# Patient Record
Sex: MALE | Race: WHITE | NOT HISPANIC OR LATINO | Employment: FULL TIME | URBAN - METROPOLITAN AREA
[De-identification: names, ages, dates, MRNs, and addresses within clinical notes are randomized per-mention and may not be internally consistent; named-entity substitution may affect disease eponyms.]

---

## 2022-08-26 ENCOUNTER — VIRTUAL VISIT (OUTPATIENT)
Dept: URGENT CARE | Facility: CLINIC | Age: 62
End: 2022-08-26
Payer: COMMERCIAL

## 2022-08-26 ENCOUNTER — NURSE TRIAGE (OUTPATIENT)
Dept: NURSING | Facility: CLINIC | Age: 62
End: 2022-08-26

## 2022-08-26 DIAGNOSIS — Z53.9 ERRONEOUS ENCOUNTER--DISREGARD: Primary | ICD-10-CM

## 2022-08-27 NOTE — TELEPHONE ENCOUNTER
Cough, sore throat, chills, loss of taste.  Symptoms started 8-24-22.  Denies dyspnea, chest pain.      Gather patient reported symptoms   Assessment   Current Symptoms at time of phone call, reported by patient: (if no symptoms, document No symptoms] Cough, loss of taste, sore throat   Date of Symptom(s) onset (if applicable) 8-24-22     If at time of call, Patients symptoms hare worsened, the Patient should contact 911 or have someone drive them to Emergency Dept promptly:      If Patient calling 911, inform 911 personal that you have tested positive for the Coronavirus (COVID-19).  Place mask on and await 911 to arrive.    If Emergency Dept, If possible, please have another adult drive you to the Emergency Dept but you need to wear mask when in contact with other people.      Monoclonal Antibody Administration    You may be eligible to receive a new treatment with a monoclonal antibody for preventing hospitalization in patients at high risk for complications from COVID-19. This medication is still experimental and available on a limited basis; it is given through an IV and must be given at an infusion center. Please note that not all people who are eligible will receive the medication since it is in limited supply.  Is the patient symptomatic and onset of symptoms within the last 7 days?  Yes  Is the patient interested in a visit with a provider to discuss treatment options?: Yes  Is the patient seen at [facility]?  No: Warm transfer caller to [phone] to be scheduled with a virtual urgent provider.  During transfer, instruct  on appropriate time frame for visit     Review information with Patient    Your result was positive. This means you have COVID-19 (coronavirus).      How can I protect others?    These guidelines are for isolating before returning to work, school or .       If you DO have symptoms:  o Stay home and away from others  - For at least 5 days after your symptoms  started, AND   - You are fever free for 24 hours (with no medicine that reduces fever), AND  - Your other symptoms are better.  o Wear a mask for 10 full days any time you are around others.    If you DON'T have symptoms:  o Stay at home and away from others for at least 5 days after your positive test.  o Wear a mask for 10 full days any time you are around others.    There may be different guidelines for healthcare facilities. Please check with the specific sites before arriving.     If you've been told by a doctor that you were severely ill with COVID-19 or are immunocompromised, you should isolate for at least 10 days.    You should not go back to work until you meet the guidelines above for ending your home isolation. You don't need to be retested for COVID-19 before going back to work--studies show that you won't spread the virus if it's been at least 10 days since your symptoms started (or 20 days, if you have a weak immune system).    Employers, schools, and daycares: This is an official notice for this person's medical guidelines for returning in-person. They must meet the above guidelines before going back to work, school, or  in person.    You will receive a positive COVID-19 letter via [employer] or the mail soon with additional self-care information.      Would you like me to review some of that information with you now?  Yes    How can I take care of myself?      Get lots of rest. Drink extra fluids (unless a doctor has told you not to).      Take Tylenol (acetaminophen) for fever or pain. If you have liver or kidney problems, ask your family doctor if it's okay to take Tylenol.     Take either:     650 mg (two 325 mg pills) every 4 to 6 hours, or     1,000 mg (two 500 mg pills) every 8 hours as needed.     Note: Do not take more than 3,000 mg in one day. Acetaminophen is found in many medicines (both prescribed and over-the-counter medicines). Read all labels to be sure you don't take too  much.    For children, check the Tylenol bottle for the right dose (based on their age or weight).      If you have other health problems (like cancer, heart failure, an organ transplant or severe kidney disease): Call your specialty clinic if you don't feel better in the next 2 days.      Know when to call 911: Emergency warning signs include:    Trouble breathing or shortness of breath    Pain or pressure in the chest that doesn't go away    Feeling confused like you haven't felt before, or not being able to wake up    Bluish-colored lips or face        If you were tested for an upcoming procedure, please contact your provider for next steps.     Mitzy Chapman RN

## 2022-08-29 ENCOUNTER — VIRTUAL VISIT (OUTPATIENT)
Dept: FAMILY MEDICINE | Facility: CLINIC | Age: 62
End: 2022-08-29

## 2022-08-29 DIAGNOSIS — U07.1 INFECTION DUE TO 2019 NOVEL CORONAVIRUS: Primary | ICD-10-CM

## 2022-08-29 DIAGNOSIS — E66.3 OVERWEIGHT: ICD-10-CM

## 2022-08-29 PROCEDURE — 99203 OFFICE O/P NEW LOW 30 MIN: CPT | Mod: CS | Performed by: FAMILY MEDICINE

## 2022-08-29 ASSESSMENT — PAIN SCALES - GENERAL: PAINLEVEL: NO PAIN (0)

## 2022-08-29 NOTE — PROGRESS NOTES
Bryan is a 62 year old who is being evaluated via a billable telephone visit.      What phone number would you like to be contacted at? [phone]  How would you like to obtain your AVS? Mail a copy    Assessment & Plan     Infection due to 2019 novel coronavirus  Risk criteria met for overweight. No renal function available, but he reports no prior issues and not on any renal impairment medications. Discussed self care. Discussed isolation and quarantine. Will utilize Paxlovid treatment. All questions answered.   - nirmatrelvir and ritonavir (PAXLOVID) therapy pack  Dispense: 30 each; Refill: 0    Overweight  - nirmatrelvir and ritonavir (PAXLOVID) therapy pack  Dispense: 30 each; Refill: 0    The risks, benefits and treatment options of prescribed medications or other treatments have been discussed with the patient. The patient verbalized their understanding and should call or follow up if no improvement or if they develop further problems.    Follow up if not improving or worsening.       Floyd Dhaliwal [facility]    Subjective   Bryan is a 62 year old, presenting for the following health issues:  Covid Concern      HPI       COVID-19 Symptom Review  How many days ago did these symptoms start? 5 days ago, tested positive Thursday night    Are any of the following symptoms significant for you?    New or worsening difficulty breathing? No    Worsening cough? Yes, I am coughing up mucus.    Fever or chills? Yes, I felt feverish or had chills.    Headache: YES- gone now    Sore throat: YES    Chest pain: No    Diarrhea: YES    Body aches? YES- gone now    What treatments has patient tried? Drinking water   Does patient live in a nursing home, group home, or shelter? No  Does patient have a way to get food/medications during quarantined? Yes, I have a friend or family member who can help me.    Reports overall is starting to feel better.   Had some cough, which starts to improve.  "  Continues to have a slightly sore throat, but continues to improve.   Coughing up a little bit of \"crud\"   Current weight is 255.   He is about 6'3   No tobacco use for last 30 years   Works security at [employer].             Review of Systems   Constitutional, HEENT, cardiovascular, pulmonary, gi and gu systems are negative, except as otherwise noted.      Objective           Vitals:  No vitals were obtained today due to virtual visit.    Physical Exam   No exam, telephone visit.     Phone call duration: 12 minutes    .  ..  "

## 2022-09-13 ENCOUNTER — OFFICE VISIT (OUTPATIENT)
Dept: FAMILY MEDICINE | Facility: CLINIC | Age: 62
End: 2022-09-13
Payer: COMMERCIAL

## 2022-09-13 VITALS
BODY MASS INDEX: 29.72 KG/M2 | DIASTOLIC BLOOD PRESSURE: 88 MMHG | OXYGEN SATURATION: 96 % | HEIGHT: 75 IN | WEIGHT: 239 LBS | SYSTOLIC BLOOD PRESSURE: 143 MMHG | HEART RATE: 74 BPM

## 2022-09-13 DIAGNOSIS — R03.0 ELEVATED BLOOD PRESSURE READING WITHOUT DIAGNOSIS OF HYPERTENSION: ICD-10-CM

## 2022-09-13 DIAGNOSIS — Z86.16 HISTORY OF COVID-19: Primary | ICD-10-CM

## 2022-09-13 PROCEDURE — 90682 RIV4 VACC RECOMBINANT DNA IM: CPT | Performed by: FAMILY MEDICINE

## 2022-09-13 PROCEDURE — 90472 IMMUNIZATION ADMIN EACH ADD: CPT | Performed by: FAMILY MEDICINE

## 2022-09-13 PROCEDURE — 90714 TD VACC NO PRESV 7 YRS+ IM: CPT | Performed by: FAMILY MEDICINE

## 2022-09-13 PROCEDURE — 90471 IMMUNIZATION ADMIN: CPT | Performed by: FAMILY MEDICINE

## 2022-09-13 PROCEDURE — 99213 OFFICE O/P EST LOW 20 MIN: CPT | Mod: 25 | Performed by: FAMILY MEDICINE

## 2022-09-13 RX ORDER — CETIRIZINE HYDROCHLORIDE 10 MG/1
10 TABLET ORAL DAILY
COMMUNITY

## 2022-09-13 NOTE — LETTER
September 13, 2022      True Wakefield  [address] [place] [address]        To Whom It May Concern:    True K Ashu was seen in our clinic. He may return to work without restrictions.      Sincerely,        Silvia Potts, DO

## 2022-09-13 NOTE — PROGRESS NOTES
Assessment & Plan     History of COVID-19  Tested positive for COVID-19 and was subsequently out of work 8/25 through 9/13. S/p paxlovid with near resolution of his symptoms.  Letter written today to return to work without restrictions.    Elevated blood pressure reading without diagnosis of hypertension  Blood pressure mildly elevated on recheck today.  No prior diagnosis of hypertension but has not had routine medical care.  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      Return in about 4 weeks (around 10/11/2022) for Routine preventive.    Silvia Potts [facility]    Rigo Adams is a 62 year old, presenting for the following health issues:  Letter for School/Work (Letter for work due to being out with covid, Out of work 8/25-9/13, was on paxlovid )      History of Present Illness       Reason for visit:  Need clearance to return to work after missing over 10 days due to a positive Covid-19 test. Have recently retested negative.    He eats 0-1 servings of fruits and vegetables daily.He consumes 4 sweetened beverage(s) daily.He exercises with enough effort to increase his heart rate 30 to 60 minutes per day.  He exercises with enough effort to increase his heart rate 5 days per week.   He is taking medications regularly.     Left URI symptoms on 8/24/2022, tested positive for COVID-19 the following day.  Symptoms included cough, congestion, sore throat.  His cough persisted and he was asked to remain out of work until he had a negative test.  He was notified by his employer with updates that he does not need a negative test to return as long as his symptoms are improving and it has been greater than 5 days since symptom onset.  He initially was on Paxil bid and had 1 day of diarrhea but otherwise tolerated this well.   "He had a slight cough that persisted but it is much better today than before.  He denies shortness of breath or chest pain.  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  He would like to return to work tomorrow and does not need restrictions.        Objective    BP (!) 143/88   Pulse 74   Ht 1.905 m (6' 3\")   Wt 108.4 kg (239 lb)   SpO2 96%   BMI 29.87 kg/m    Body mass index is 29.87 kg/m .  Physical Exam   GENERAL: healthy, alert and no distress  EYES: Eyes grossly normal to inspection, PERRL and conjunctivae and sclerae normal  RESP: lungs clear to auscultation - no rales, rhonchi or wheezes  CV: regular rate and rhythm, normal S1 S2, no S3 or S4, no murmur, click or rub, no peripheral edema and peripheral pulses strong  PSYCH: mentation appears normal, affect normal/bright        "

## 2022-10-01 ENCOUNTER — HEALTH MAINTENANCE LETTER (OUTPATIENT)
Age: 62
End: 2022-10-01

## 2023-10-15 ENCOUNTER — HEALTH MAINTENANCE LETTER (OUTPATIENT)
Age: 63
End: 2023-10-15

## 2023-11-05 ENCOUNTER — APPOINTMENT (OUTPATIENT)
Dept: MRI IMAGING | Facility: HOSPITAL | Age: 63
End: 2023-11-05
Attending: EMERGENCY MEDICINE
Payer: COMMERCIAL

## 2023-11-05 ENCOUNTER — HOSPITAL ENCOUNTER (EMERGENCY)
Facility: HOSPITAL | Age: 63
Discharge: HOME | End: 2023-11-06
Attending: EMERGENCY MEDICINE | Admitting: EMERGENCY MEDICINE
Payer: COMMERCIAL

## 2023-11-05 DIAGNOSIS — H81.399 PERIPHERAL VERTIGO, UNSPECIFIED LATERALITY: ICD-10-CM

## 2023-11-05 LAB
ANION GAP SERPL CALCULATED.3IONS-SCNC: 8 MMOL/L (ref 7–15)
BASOPHILS # BLD AUTO: 0 10E3/UL (ref 0–0.2)
BASOPHILS NFR BLD AUTO: 0 %
BUN SERPL-MCNC: 12.3 MG/DL (ref 8–23)
CALCIUM SERPL-MCNC: 11.2 MG/DL (ref 8.8–10.2)
CHLORIDE SERPL-SCNC: 105 MMOL/L (ref 98–107)
CREAT SERPL-MCNC: 0.74 MG/DL (ref 0.67–1.17)
DEPRECATED HCO3 PLAS-SCNC: 25 MMOL/L (ref 22–29)
EGFRCR SERPLBLD CKD-EPI 2021: >90 ML/MIN/1.73M2
EOSINOPHIL # BLD AUTO: 0 10E3/UL (ref 0–0.7)
EOSINOPHIL NFR BLD AUTO: 0 %
ERYTHROCYTE [DISTWIDTH] IN BLOOD BY AUTOMATED COUNT: 13.3 % (ref 10–15)
GLUCOSE BLDC GLUCOMTR-MCNC: 112 MG/DL (ref 70–99)
GLUCOSE SERPL-MCNC: 115 MG/DL (ref 70–99)
HCT VFR BLD AUTO: 45.9 % (ref 40–53)
HGB BLD-MCNC: 15.8 G/DL (ref 13.3–17.7)
HOLD SPECIMEN: NORMAL
HOLD SPECIMEN: NORMAL
IMM GRANULOCYTES # BLD: 0 10E3/UL
IMM GRANULOCYTES NFR BLD: 0 %
LYMPHOCYTES # BLD AUTO: 0.9 10E3/UL (ref 0.8–5.3)
LYMPHOCYTES NFR BLD AUTO: 16 %
MCH RBC QN AUTO: 30.2 PG (ref 26.5–33)
MCHC RBC AUTO-ENTMCNC: 34.4 G/DL (ref 31.5–36.5)
MCV RBC AUTO: 88 FL (ref 78–100)
MONOCYTES # BLD AUTO: 0.5 10E3/UL (ref 0–1.3)
MONOCYTES NFR BLD AUTO: 8 %
NEUTROPHILS # BLD AUTO: 4 10E3/UL (ref 1.6–8.3)
NEUTROPHILS NFR BLD AUTO: 76 %
NRBC # BLD AUTO: 0 10E3/UL
NRBC BLD AUTO-RTO: 0 /100
PLATELET # BLD AUTO: 169 10E3/UL (ref 150–450)
POTASSIUM SERPL-SCNC: 3.7 MMOL/L (ref 3.4–5.3)
RBC # BLD AUTO: 5.23 10E6/UL (ref 4.4–5.9)
SODIUM SERPL-SCNC: 138 MMOL/L (ref 135–145)
WBC # BLD AUTO: 5.4 10E3/UL (ref 4–11)

## 2023-11-05 PROCEDURE — 70549 MR ANGIOGRAPH NECK W/O&W/DYE: CPT

## 2023-11-05 PROCEDURE — A9585 GADOBUTROL INJECTION: HCPCS | Mod: JZ | Performed by: EMERGENCY MEDICINE

## 2023-11-05 PROCEDURE — 80048 BASIC METABOLIC PNL TOTAL CA: CPT | Performed by: EMERGENCY MEDICINE

## 2023-11-05 PROCEDURE — 36415 COLL VENOUS BLD VENIPUNCTURE: CPT | Performed by: EMERGENCY MEDICINE

## 2023-11-05 PROCEDURE — 70544 MR ANGIOGRAPHY HEAD W/O DYE: CPT | Mod: XU

## 2023-11-05 PROCEDURE — 255N000002 HC RX 255 OP 636: Mod: JZ | Performed by: EMERGENCY MEDICINE

## 2023-11-05 PROCEDURE — 93005 ELECTROCARDIOGRAM TRACING: CPT | Performed by: EMERGENCY MEDICINE

## 2023-11-05 PROCEDURE — 70553 MRI BRAIN STEM W/O & W/DYE: CPT

## 2023-11-05 PROCEDURE — 99285 EMERGENCY DEPT VISIT HI MDM: CPT | Mod: 25

## 2023-11-05 PROCEDURE — 85025 COMPLETE CBC W/AUTO DIFF WBC: CPT | Performed by: EMERGENCY MEDICINE

## 2023-11-05 PROCEDURE — 82962 GLUCOSE BLOOD TEST: CPT

## 2023-11-05 PROCEDURE — 250N000013 HC RX MED GY IP 250 OP 250 PS 637: Performed by: EMERGENCY MEDICINE

## 2023-11-05 RX ORDER — MECLIZINE HYDROCHLORIDE 25 MG/1
25 TABLET ORAL 3 TIMES DAILY PRN
Qty: 30 TABLET | Refills: 0 | Status: SHIPPED | OUTPATIENT
Start: 2023-11-05 | End: 2023-11-24

## 2023-11-05 RX ORDER — ONDANSETRON 4 MG/1
4 TABLET, ORALLY DISINTEGRATING ORAL EVERY 8 HOURS PRN
Qty: 12 TABLET | Refills: 0 | Status: SHIPPED | OUTPATIENT
Start: 2023-11-05

## 2023-11-05 RX ORDER — GADOBUTROL 604.72 MG/ML
10 INJECTION INTRAVENOUS ONCE
Status: COMPLETED | OUTPATIENT
Start: 2023-11-05 | End: 2023-11-05

## 2023-11-05 RX ORDER — MECLIZINE HCL 12.5 MG 12.5 MG/1
25 TABLET ORAL ONCE
Status: DISCONTINUED | OUTPATIENT
Start: 2023-11-05 | End: 2023-11-05

## 2023-11-05 RX ORDER — MECLIZINE HCL 12.5 MG 12.5 MG/1
50 TABLET ORAL ONCE
Status: COMPLETED | OUTPATIENT
Start: 2023-11-05 | End: 2023-11-05

## 2023-11-05 RX ADMIN — GADOBUTROL 10 ML: 604.72 INJECTION INTRAVENOUS at 22:22

## 2023-11-05 RX ADMIN — MECLIZINE HYDROCHLORIDE 50 MG: 12.5 TABLET ORAL at 21:11

## 2023-11-05 ASSESSMENT — ACTIVITIES OF DAILY LIVING (ADL)
ADLS_ACUITY_SCORE: 35
ADLS_ACUITY_SCORE: 35

## 2023-11-05 NOTE — Clinical Note
True Wakefield was seen and treated in our emergency department on 11/5/2023.  He may return to work on 11/11/2023.       If you have any questions or concerns, please don't hesitate to call.      Noe Mane, DO

## 2023-11-06 VITALS
WEIGHT: 246.1 LBS | RESPIRATION RATE: 18 BRPM | OXYGEN SATURATION: 95 % | HEIGHT: 75 IN | DIASTOLIC BLOOD PRESSURE: 96 MMHG | BODY MASS INDEX: 30.6 KG/M2 | TEMPERATURE: 98.2 F | HEART RATE: 74 BPM | SYSTOLIC BLOOD PRESSURE: 160 MMHG

## 2023-11-06 NOTE — ED PROVIDER NOTES
"EMERGENCY DEPARTMENT NOTE     Name: True Wakefield    Age/Sex: 63 year old male   MRN: [id number]   Evaluation Date & Time:  11/5/2023  8:02 PM    PCP:    Rome Ledezma   ED Provider: Noe Mane D.O.       CHIEF COMPLAINT    Dizziness       DIAGNOSIS & DISPOSITION/MEDICAL DECISION MAKING   No diagnosis found.    True Wakefield is a 63 year old male with no relevant past history (reviewed) who presents to the emergency department for evaluation of dizziness described as vertigo    Differential  diagnosis considered included but not limited to control versus peripheral cause of vertigo or electrolyte derangement    Medical Decision Making  Patient on exam had vertical rotary nystagmus.  Other cerebellar function testing within normal limits.  MRI of the brain showed no evidence of ischemic or hemorrhagic stroke and MRA of the intracranial and carotid vertebral arteries within normal limits without evidence of posterior circulation problem including vertebral artery dissection.  Laboratory evaluation with CBC and basic metabolic profile within normal limits.  Patient has had remained with meclizine.  Patient will be discharged to continue meclizine for management of vertigo symptoms.  Follow-up with primary care physician this week.  We will also use Zofran as needed for nausea.  Return criteria discussed and if worsening vertigo not improved with meclizine and feels of also describes vomiting despite use of Zofran will return to the emergency department.  Interventions: Oral meclizine  Discharge Vital Signs:BP (!) 152/91   Pulse 76   Temp 98.2  F (36.8  C) (Oral)   Resp 18   Ht 1.905 m (6' 3\")   Wt 111.6 kg (246 lb 1.6 oz)   SpO2 93%   BMI 30.76 kg/m       DISPOSITION: Home    Diagnostic studies:  Imaging:  MRA Neck (Carotids) wo & w Contrast   Final Result   IMPRESSION:   HEAD MRI:    1.  No acute intracranial process.      HEAD MRA:    1.  No aneurysm, high flow AVM or significant stenosis " identified.   2.  Variant [place] anatomy as above.      NECK MRA:   1.  No flow-limiting stenosis of the cervical arterial vasculature.      MRA Brain ([place]) wo Contrast   Final Result   IMPRESSION:   HEAD MRI:    1.  No acute intracranial process.      HEAD MRA:    1.  No aneurysm, high flow AVM or significant stenosis identified.   2.  Variant [place] anatomy as above.      NECK MRA:   1.  No flow-limiting stenosis of the cervical arterial vasculature.      MR Brain w/o & w Contrast   Final Result   IMPRESSION:   HEAD MRI:    1.  No acute intracranial process.      HEAD MRA:    1.  No aneurysm, high flow AVM or significant stenosis identified.   2.  Variant [place] anatomy as above.      NECK MRA:   1.  No flow-limiting stenosis of the cervical arterial vasculature.         Lab:  Labs Ordered and Resulted from Time of ED Arrival to Time of ED Departure   GLUCOSE BY METER - Abnormal       Result Value    GLUCOSE BY METER POCT 112 (*)    BASIC METABOLIC PANEL - Abnormal    Sodium 138      Potassium 3.7      Chloride 105      Carbon Dioxide (CO2) 25      Anion Gap 8      Urea Nitrogen 12.3      Creatinine 0.74      GFR Estimate >90      Calcium 11.2 (*)     Glucose 115 (*)    CBC WITH PLATELETS AND DIFFERENTIAL    WBC Count 5.4      RBC Count 5.23      Hemoglobin 15.8      Hematocrit 45.9      MCV 88      MCH 30.2      MCHC 34.4      RDW 13.3      Platelet Count 169      % Neutrophils 76      % Lymphocytes 16      % Monocytes 8      % Eosinophils 0      % Basophils 0      % Immature Granulocytes 0      NRBCs per 100 WBC 0      Absolute Neutrophils 4.0      Absolute Lymphocytes 0.9      Absolute Monocytes 0.5      Absolute Eosinophils 0.0      Absolute Basophils 0.0      Absolute Immature Granulocytes 0.0      Absolute NRBCs 0.0                 Triage note reviewed:Patient arrives to triage via a friend from home with chief compliant of dizziness since 0300 Saturday morning.   Patient reports when he goes from sitting to standing or moving his head around he gets severe dizziness which makes it difficult to walk.  Endorses nausea, vomiting and headache.  Reports taking Ibuprofen about two hours and it is helping the headache.  Blood glucose 112 mg/dL in triage.  Patient is hypertensive in triage, reports this has been an issue lately.  Alert and oriented x4.             History:  Supplemental history from: Patients Friend  External Record(s) reviewed: Documented in chart, if applicable. PCP visit on 9/13/22    Work Up:  Chart documentation includes differential considered and any EKGs or imaging independently interpreted by provider, where specified.  In additional to work up documented, I considered the following work up: NA    External consultation:  Discussion of management with another provider: NA    Complicating factors:  Care impacted by chronic illness: N/A  Care affected by social determinants of health: N/A    Disposition considerations: Discharge. I prescribed additional prescription strength medication(s) as charted. N/A.    At the conclusion of the encounter I discussed the results of all of the tests and the disposition. The questions were answered. The patient or family acknowledged understanding and was agreeable with the care plan.    TOTAL CRITICAL CARE TIME (EXCLUDING PROCEDURES): Not applicable    PROCEDURES:   None    EMERGENCY DEPARTMENT COURSE   8:27 PM I met with the patient to gather history and to perform my initial exam.  We discussed treatment options and the plan for care while in the Emergency Department.    ED INTERVENTIONS     Medications   meclizine (ANTIVERT) tablet 50 mg (50 mg Oral $Given 11/5/23 2111)   gadobutrol (GADAVIST) injection 10 mL (10 mLs Intravenous $Given 11/5/23 2222)       DISCHARGE MEDICATIONS        Review of your medicines        UNREVIEWED medicines. Ask your doctor about these medicines        Dose / Directions   cetirizine 10 MG  tablet  Commonly known as: zyrTEC      Dose: 10 mg  Take 10 mg by mouth daily  Refills: 0     Omeprazole 20 MG Tbdd      Dose: 20 mg  Take 20 mg by mouth daily  Refills: 0     sodium chloride 0.65 % nasal spray  Commonly known as: OCEAN      Dose: 1 spray  Spray 1 spray into both nostrils daily as needed for congestion  Refills: 0                INFORMATION SOURCE AND LIMITATIONS    History/Exam limitations: none  Patient information was obtained from: patient   Use of : N/A     HISTORY OF PRESENT ILLNESS   True Wakefield is a 63 year old year old male with no relevant past history (reviewed), who presents to this ED walk in for evaluation of dizziness.    Since 3 am on Saturday morning, the patient reports room spinning dizziness. He notes this episode lasted 10-15 minutes before he was able to go to bed. In the morning, he woke up with residual dizziness that has been constant since. He notes its worse with standing and moving his head. The patient endorses off balance gait. He reports an associated headache. He has been taking tylenol with relief. He otherwise denies any other complaints at this time.    REVIEW OF SYSTEMS:   All other systems reviewed and are negative except as noted above in HPI.    PATIENT HISTORY   History reviewed. No pertinent past medical history.  Patient Active Problem List   Diagnosis    Overweight     History reviewed. No pertinent surgical history.    Allergies   Allergen Reactions    Amoxicillin-Pot Clavulanate Anaphylaxis and Swelling    Amoxicillin Swelling    Clavulanic Acid Swelling       OUTPATIENT MEDICATIONS     New Prescriptions    No medications on file      Vitals:    11/05/23 2145 11/05/23 2258 11/05/23 2300 11/05/23 2315   BP: (!) 184/98 (!) 165/84 (!) 153/82 (!) 152/91   Pulse: 77 81 79 76   Resp: 30  15 18   Temp:       TempSrc:       SpO2: 91% 93% 94% 93%   Weight:       Height:           Physical Exam   Constitutional: Oriented to person, place, and time.  Appears well-developed and well-nourished.   HEENT: PERRLA,vertical and nystagmus present  TMs normal  Neck: Normal range of motion. Neck supple.  No carotid bruit  Cardiovascular: Normal rate, regular rhythm and normal heart sounds.    Pulmonary/Chest: Normal effort  and breath sounds normal.   Neurological: Alert and oriented to person, place, and time. Normal strength. No sensory deficit. No cranial nerve deficit.  Skin: Skin is warm and dry.   Psychiatric: Normal mood and affect. Behavior is normal. Thought content normal.     DIAGNOSTICS    LABORATORY FINDINGS (REVIEWED AND INTERPRETED):  Labs Ordered and Resulted from Time of ED Arrival to Time of ED Departure   GLUCOSE BY METER - Abnormal       Result Value    GLUCOSE BY METER POCT 112 (*)    BASIC METABOLIC PANEL - Abnormal    Sodium 138      Potassium 3.7      Chloride 105      Carbon Dioxide (CO2) 25      Anion Gap 8      Urea Nitrogen 12.3      Creatinine 0.74      GFR Estimate >90      Calcium 11.2 (*)     Glucose 115 (*)    CBC WITH PLATELETS AND DIFFERENTIAL    WBC Count 5.4      RBC Count 5.23      Hemoglobin 15.8      Hematocrit 45.9      MCV 88      MCH 30.2      MCHC 34.4      RDW 13.3      Platelet Count 169      % Neutrophils 76      % Lymphocytes 16      % Monocytes 8      % Eosinophils 0      % Basophils 0      % Immature Granulocytes 0      NRBCs per 100 WBC 0      Absolute Neutrophils 4.0      Absolute Lymphocytes 0.9      Absolute Monocytes 0.5      Absolute Eosinophils 0.0      Absolute Basophils 0.0      Absolute Immature Granulocytes 0.0      Absolute NRBCs 0.0           IMAGING (REVIEWED AND INTERPRETED):  MRA Neck (Carotids) wo & w Contrast   Final Result   IMPRESSION:   HEAD MRI:    1.  No acute intracranial process.      HEAD MRA:    1.  No aneurysm, high flow AVM or significant stenosis identified.   2.  Variant [place] anatomy as above.      NECK MRA:   1.  No flow-limiting stenosis of the cervical arterial vasculature.       MRA Brain ([place]) wo Contrast   Final Result   IMPRESSION:   HEAD MRI:    1.  No acute intracranial process.      HEAD MRA:    1.  No aneurysm, high flow AVM or significant stenosis identified.   2.  Variant [place] anatomy as above.      NECK MRA:   1.  No flow-limiting stenosis of the cervical arterial vasculature.      MR Brain w/o & w Contrast   Final Result   IMPRESSION:   HEAD MRI:    1.  No acute intracranial process.      HEAD MRA:    1.  No aneurysm, high flow AVM or significant stenosis identified.   2.  Variant [place] anatomy as above.      NECK MRA:   1.  No flow-limiting stenosis of the cervical arterial vasculature.            ECG (REVIEWED AND INTERPRETED):   ECG:   Performed at: 20:17  HR:  79bpm  Rhythm: Sinus rhythm  Axis: 89  QRS duration: 104  QTC: 431  ST changes: No ST segment elevation or depression, no T wave inversion,No Q wave  Interpretation: Sinus rhythm    No prior for comparison    I have reviewed the patient's ECG, with comments made as listed above. Please see scanned image for full interpretation.         I, Roro De Paz, am serving as a scribe to document services personally performed by Noe Mane D.O., based on my observation and the provider s statements to me.    I, Noe Mane D.O., attest that Roro De Paz is acting in a scribe capacity, has observed my performance of the services and has documented them in accordance with my direction.    Noe Mane D.O.  EMERGENCY MEDICINE   11/05/23  [facility] EMERGENCY DEPARTMENT  [address] [place] [id number]  [phone]  Dept: [phone]     Noe Mane DO  11/05/23 7034

## 2023-11-06 NOTE — DISCHARGE INSTRUCTIONS
Your MRI and MRA did not show any abnormalities.  The dizziness is probably related to an inner ear process.  Continue meclizine every 6 hours for dizziness and use Zofran as needed for nausea.  You can trial Epley maneuvers or exercises to see if this results in improvement.  Follow-up with your primary care physician this week to determine if you need further referral to vestibular clinic.  If you have recurrent dizziness not improved with meclizine and feels it falls risk or vomiting despite the use of Zofran return to the emergency department.

## 2023-11-06 NOTE — ED TRIAGE NOTES
Patient arrives to triage via a friend from home with chief compliant of dizziness since 0300 Saturday morning.  Patient reports when he goes from sitting to standing or moving his head around he gets severe dizziness which makes it difficult to walk.  Endorses nausea, vomiting and headache.  Reports taking Ibuprofen about two hours and it is helping the headache.  Blood glucose 112 mg/dL in triage.  Patient is hypertensive in triage, reports this has been an issue lately.  Alert and oriented x4.

## 2023-11-20 LAB
ATRIAL RATE - MUSE: 79 BPM
DIASTOLIC BLOOD PRESSURE - MUSE: 86 MMHG
INTERPRETATION ECG - MUSE: NORMAL
P AXIS - MUSE: 69 DEGREES
PR INTERVAL - MUSE: 168 MS
QRS DURATION - MUSE: 104 MS
QT - MUSE: 376 MS
QTC - MUSE: 431 MS
R AXIS - MUSE: 89 DEGREES
SYSTOLIC BLOOD PRESSURE - MUSE: 171 MMHG
T AXIS - MUSE: 55 DEGREES
VENTRICULAR RATE- MUSE: 79 BPM

## 2023-11-24 ENCOUNTER — OFFICE VISIT (OUTPATIENT)
Dept: FAMILY MEDICINE | Facility: CLINIC | Age: 63
End: 2023-11-24
Payer: COMMERCIAL

## 2023-11-24 VITALS
TEMPERATURE: 97.8 F | RESPIRATION RATE: 18 BRPM | BODY MASS INDEX: 31.07 KG/M2 | WEIGHT: 248.6 LBS | OXYGEN SATURATION: 100 % | DIASTOLIC BLOOD PRESSURE: 93 MMHG | SYSTOLIC BLOOD PRESSURE: 159 MMHG | HEART RATE: 72 BPM

## 2023-11-24 DIAGNOSIS — E83.52 HYPERCALCEMIA: ICD-10-CM

## 2023-11-24 DIAGNOSIS — Z12.5 SCREENING PSA (PROSTATE SPECIFIC ANTIGEN): ICD-10-CM

## 2023-11-24 DIAGNOSIS — R73.9 ELEVATED BLOOD SUGAR: Primary | ICD-10-CM

## 2023-11-24 DIAGNOSIS — R42 VERTIGO: ICD-10-CM

## 2023-11-24 LAB
ANION GAP SERPL CALCULATED.3IONS-SCNC: 8 MMOL/L (ref 7–15)
BUN SERPL-MCNC: 14.2 MG/DL (ref 8–23)
CALCIUM SERPL-MCNC: 10.6 MG/DL (ref 8.8–10.2)
CHLORIDE SERPL-SCNC: 106 MMOL/L (ref 98–107)
CREAT SERPL-MCNC: 0.85 MG/DL (ref 0.67–1.17)
DEPRECATED HCO3 PLAS-SCNC: 25 MMOL/L (ref 22–29)
EGFRCR SERPLBLD CKD-EPI 2021: >90 ML/MIN/1.73M2
GLUCOSE SERPL-MCNC: 105 MG/DL (ref 70–99)
POTASSIUM SERPL-SCNC: 4.1 MMOL/L (ref 3.4–5.3)
PSA SERPL DL<=0.01 NG/ML-MCNC: 2.87 NG/ML (ref 0–4.5)
SODIUM SERPL-SCNC: 139 MMOL/L (ref 135–145)

## 2023-11-24 PROCEDURE — G0103 PSA SCREENING: HCPCS | Performed by: FAMILY MEDICINE

## 2023-11-24 PROCEDURE — 80048 BASIC METABOLIC PNL TOTAL CA: CPT | Performed by: FAMILY MEDICINE

## 2023-11-24 PROCEDURE — 36415 COLL VENOUS BLD VENIPUNCTURE: CPT | Performed by: FAMILY MEDICINE

## 2023-11-24 PROCEDURE — 99213 OFFICE O/P EST LOW 20 MIN: CPT | Performed by: FAMILY MEDICINE

## 2023-11-24 NOTE — PROGRESS NOTES
Rigo Adams is a 63 year old, presenting for the following health issues:  Hospital F/U ([facility] 11/5/23,the patient states that he has been doing good since being in the ED and states that the symptoms are gone. )    Had an MRI of the brain, MRA of the brain and carotid arteries all without significant findings.  All done at the ER on 11-5-23.  All symptoms nearly completely gone.  Walking normal.  No weakness in arms or legs.  No visual symptoms remaining.  Used up his meclizine given in the ER. Hasn't been used for about 3 days.     HPI         Hospital Follow-up Visit:    Hospital/Nursing Home/IP Rehab Facility: [facility]  Date of Admission: 11/5/23   Date of Discharge: 11/6/23  Reason(s) for Admission: Peripheral vertigo, unspecified laterality     Was your hospitalization related to COVID-19? No   Problems taking medications regularly:  None  Medication changes since discharge: completed a course of meclizine  Problems adhering to non-medication therapy:  None    Summary of hospitalization:  [facility] discharge summary reviewed  Diagnostic Tests/Treatments reviewed.  Follow up needed: none  Other Healthcare Providers Involved in Patient s Care:         None  Update since discharge: improved.         Plan of care communicated with patient             Objective    BP (!) 159/93 (BP Location: Left arm, Patient Position: Sitting, Cuff Size: Adult Regular)   Pulse 72   Temp 97.8  F (36.6  C) (Oral)   Resp 18   Wt 112.8 kg (248 lb 9.6 oz)   SpO2 100%   BMI 31.07 kg/m    Body mass index is 31.07 kg/m .  Physical Exam   NEURO EXAM    ALERT, ORIENTED TIMES THREE  PERRL  CN 2-12:   INTACT  DTR'S:   SYMMETRIC IN THE UE AND LE  MOTOR:   NORMAL UE AND LE  CEREBELLAR:   NORMAL FINGER TO NOSE MOVEMENT  GAIT NORMAL: NEGATIVE  TANDEM WALK:   NORMAL  TREMOR:  NONE  ROMBERG:  NEGATIVE         Study Result    Narrative & Impression   EXAM: MRA BRAIN (Tribe OF  BERMUDEZ) W/O CONTRAST, MRA NECK (CAROTIDS) W/O and W CONTRAST, MR BRAIN W/O and W CONTRAST  LOCATION: [facility]  DATE: 11/5/2023     INDICATION: Neuro deficit. Vertigo and ataxia.  COMPARISON: None.  CONTRAST: 10ml gadavist  TECHNIQUE:   1) Routine multiplanar multisequence head MRI without and with intravenous contrast.  2) 3D time-of-flight head MRA without intravenous contrast.  3) Neck MRA without and with IV contrast. Stenosis measurements made according to NASCET criteria unless otherwise specified.     FINDINGS:  HEAD MRI:  INTRACRANIAL CONTENTS: No acute or subacute infarct. No mass, acute hemorrhage, or extra-axial fluid collections. Normal brain parenchymal signal. Normal ventricles and sulci. Normal position of the cerebellar tonsils. No pathologic contrast enhancement.     SELLA: No abnormality accounting for technique.     OSSEOUS STRUCTURES/SOFT TISSUES: Normal marrow signal. The major intracranial vascular flow voids are maintained.      ORBITS: No abnormality accounting for technique.      SINUSES/MASTOIDS: Mild mucosal thickening scattered about the paranasal sinuses. No middle ear or mastoid effusion.      HEAD MRA:   ANTERIOR CIRCULATION: No stenosis/occlusion, aneurysm, or high flow vascular malformation. Fetal origin of the left posterior cerebral artery from the anterior circulation.     POSTERIOR CIRCULATION: No stenosis/occlusion, aneurysm, or high flow vascular malformation. Dominant left and smaller right vertebral artery contribute to a normal basilar artery.      NECK MRA:   RIGHT CAROTID: No measurable stenosis.     LEFT CAROTID: No measurable stenosis.     VERTEBRAL ARTERIES: No focal stenosis. Dominant left and smaller right vertebral arteries.     AORTIC ARCH: Bovine origin left common carotid artery. No significant stenosis at the origin of the great vessels.                                                                      IMPRESSION:  HEAD MRI:   1.  No  acute intracranial process.     HEAD MRA:   1.  No aneurysm, high flow AVM or significant stenosis identified.  2.  Variant [place] of Herrera anatomy as above.     NECK MRA:  1.  No flow-limiting stenosis of the cervical arterial vasculature.       Encounter Diagnoses   Name Primary?    Vertigo, resolved     Hypercalcemia, recheck calcium     Screening PSA (prostate specific antigen), check routine PSA screen           PLAN:   Recheck calcium    Will get a PSA sdreen as long as we are drawing labs today    Discuss colon cancer screening with Dr Potts at an upcoming physical

## 2023-11-24 NOTE — PATIENT INSTRUCTIONS
Recheck calcium    Will get a PSA sdreen as long as we are drawing labs today    Discuss colon cancer screening with Dr Potts at an upcoming physical

## 2023-11-24 NOTE — LETTER
November 26, 2023      Bryan Wakefield  [address] [place] [address]        Dear NatalieAshu  We are writing to inform you of your test results.    Hi Bryan:  Your calcium is better but still a hair elevated.  Also the blood sugar is upper normal.  When you come in for your physical I would get follow up on these labs and have placed some future lab orders.    Your PSA is in the normal range but I do not have an old one to compare it with.  You should have your PSA repeated in one year.    Resulted Orders   Basic metabolic panel  (Ca, Cl, CO2, Creat, Gluc, K, Na, BUN)   Result Value Ref Range    Sodium 139 135 - 145 mmol/L      Comment:      Reference intervals for this test were updated on 09/26/2023 to more accurately reflect our healthy population. There may be differences in the flagging of prior results with similar values performed with this method. Interpretation of those prior results can be made in the context of the updated reference intervals.     Potassium 4.1 3.4 - 5.3 mmol/L    Chloride 106 98 - 107 mmol/L    Carbon Dioxide (CO2) 25 22 - 29 mmol/L    Anion Gap 8 7 - 15 mmol/L    Urea Nitrogen 14.2 8.0 - 23.0 mg/dL    Creatinine 0.85 0.67 - 1.17 mg/dL    GFR Estimate >90 >60 mL/min/1.73m2    Calcium 10.6 (H) 8.8 - 10.2 mg/dL    Glucose 105 (H) 70 - 99 mg/dL   PSA, screen   Result Value Ref Range    Prostate Specific Antigen Screen 2.87 0.00 - 4.50 ng/mL    Narrative    This result is obtained using the Roche Elecsys total PSA method on the jasbir [id number] immunoassay analyzer. Results obtained with different assay methods or kits cannot be used interchangeably.       If you have any questions or concerns, please call the clinic at the number listed above.       Sincerely,      Jeffrey Torres MD

## 2024-05-12 ENCOUNTER — HEALTH MAINTENANCE LETTER (OUTPATIENT)
Age: 64
End: 2024-05-12

## 2024-07-03 ENCOUNTER — APPOINTMENT (OUTPATIENT)
Dept: ULTRASOUND IMAGING | Facility: HOSPITAL | Age: 64
End: 2024-07-03
Attending: EMERGENCY MEDICINE
Payer: COMMERCIAL

## 2024-07-03 ENCOUNTER — HOSPITAL ENCOUNTER (EMERGENCY)
Facility: HOSPITAL | Age: 64
Discharge: HOME | End: 2024-07-03
Attending: EMERGENCY MEDICINE | Admitting: EMERGENCY MEDICINE
Payer: COMMERCIAL

## 2024-07-03 ENCOUNTER — APPOINTMENT (OUTPATIENT)
Dept: CT IMAGING | Facility: HOSPITAL | Age: 64
End: 2024-07-03
Attending: EMERGENCY MEDICINE
Payer: COMMERCIAL

## 2024-07-03 VITALS
BODY MASS INDEX: 32.45 KG/M2 | OXYGEN SATURATION: 99 % | DIASTOLIC BLOOD PRESSURE: 74 MMHG | RESPIRATION RATE: 18 BRPM | WEIGHT: 261 LBS | HEIGHT: 75 IN | TEMPERATURE: 98.6 F | HEART RATE: 58 BPM | SYSTOLIC BLOOD PRESSURE: 160 MMHG

## 2024-07-03 DIAGNOSIS — I87.2 VENOUS INCOMPETENCE: ICD-10-CM

## 2024-07-03 DIAGNOSIS — L03.116 CELLULITIS OF LEFT ANKLE: ICD-10-CM

## 2024-07-03 LAB
ANION GAP SERPL CALCULATED.3IONS-SCNC: 8 MMOL/L (ref 7–15)
BASOPHILS # BLD AUTO: 0 10E3/UL (ref 0–0.2)
BASOPHILS NFR BLD AUTO: 1 %
BUN SERPL-MCNC: 14.8 MG/DL (ref 8–23)
CALCIUM SERPL-MCNC: 10.5 MG/DL (ref 8.8–10.2)
CHLORIDE SERPL-SCNC: 106 MMOL/L (ref 98–107)
CREAT SERPL-MCNC: 0.79 MG/DL (ref 0.67–1.17)
DEPRECATED HCO3 PLAS-SCNC: 27 MMOL/L (ref 22–29)
EGFRCR SERPLBLD CKD-EPI 2021: >90 ML/MIN/1.73M2
EOSINOPHIL # BLD AUTO: 0.1 10E3/UL (ref 0–0.7)
EOSINOPHIL NFR BLD AUTO: 2 %
ERYTHROCYTE [DISTWIDTH] IN BLOOD BY AUTOMATED COUNT: 14.3 % (ref 10–15)
GLUCOSE SERPL-MCNC: 97 MG/DL (ref 70–99)
HCT VFR BLD AUTO: 40.4 % (ref 40–53)
HGB BLD-MCNC: 13.2 G/DL (ref 13.3–17.7)
HOLD SPECIMEN: NORMAL
IMM GRANULOCYTES # BLD: 0 10E3/UL
IMM GRANULOCYTES NFR BLD: 0 %
LACTATE SERPL-SCNC: 1.1 MMOL/L (ref 0.7–2)
LYMPHOCYTES # BLD AUTO: 1 10E3/UL (ref 0.8–5.3)
LYMPHOCYTES NFR BLD AUTO: 20 %
MAGNESIUM SERPL-MCNC: 2.2 MG/DL (ref 1.7–2.3)
MCH RBC QN AUTO: 29.4 PG (ref 26.5–33)
MCHC RBC AUTO-ENTMCNC: 32.7 G/DL (ref 31.5–36.5)
MCV RBC AUTO: 90 FL (ref 78–100)
MONOCYTES # BLD AUTO: 0.3 10E3/UL (ref 0–1.3)
MONOCYTES NFR BLD AUTO: 7 %
NEUTROPHILS # BLD AUTO: 3.4 10E3/UL (ref 1.6–8.3)
NEUTROPHILS NFR BLD AUTO: 70 %
NRBC # BLD AUTO: 0 10E3/UL
NRBC BLD AUTO-RTO: 0 /100
PLATELET # BLD AUTO: 152 10E3/UL (ref 150–450)
POTASSIUM SERPL-SCNC: 4 MMOL/L (ref 3.4–5.3)
RBC # BLD AUTO: 4.49 10E6/UL (ref 4.4–5.9)
SODIUM SERPL-SCNC: 141 MMOL/L (ref 135–145)
TSH SERPL DL<=0.005 MIU/L-ACNC: 1.31 UIU/ML (ref 0.3–4.2)
WBC # BLD AUTO: 4.8 10E3/UL (ref 4–11)

## 2024-07-03 PROCEDURE — 36592 COLLECT BLOOD FROM PICC: CPT | Performed by: EMERGENCY MEDICINE

## 2024-07-03 PROCEDURE — 250N000013 HC RX MED GY IP 250 OP 250 PS 637: Performed by: EMERGENCY MEDICINE

## 2024-07-03 PROCEDURE — 99285 EMERGENCY DEPT VISIT HI MDM: CPT | Mod: 25

## 2024-07-03 PROCEDURE — 93970 EXTREMITY STUDY: CPT

## 2024-07-03 PROCEDURE — 250N000011 HC RX IP 250 OP 636: Performed by: EMERGENCY MEDICINE

## 2024-07-03 PROCEDURE — 87040 BLOOD CULTURE FOR BACTERIA: CPT | Performed by: EMERGENCY MEDICINE

## 2024-07-03 PROCEDURE — 83605 ASSAY OF LACTIC ACID: CPT | Performed by: EMERGENCY MEDICINE

## 2024-07-03 PROCEDURE — 84443 ASSAY THYROID STIM HORMONE: CPT | Performed by: EMERGENCY MEDICINE

## 2024-07-03 PROCEDURE — 80048 BASIC METABOLIC PNL TOTAL CA: CPT | Performed by: EMERGENCY MEDICINE

## 2024-07-03 PROCEDURE — 83735 ASSAY OF MAGNESIUM: CPT | Performed by: EMERGENCY MEDICINE

## 2024-07-03 PROCEDURE — 36415 COLL VENOUS BLD VENIPUNCTURE: CPT | Performed by: EMERGENCY MEDICINE

## 2024-07-03 PROCEDURE — 75635 CT ANGIO ABDOMINAL ARTERIES: CPT

## 2024-07-03 PROCEDURE — 85025 COMPLETE CBC W/AUTO DIFF WBC: CPT | Performed by: EMERGENCY MEDICINE

## 2024-07-03 RX ORDER — CLINDAMYCIN HCL 300 MG
300 CAPSULE ORAL 3 TIMES DAILY
Qty: 30 CAPSULE | Refills: 0 | Status: SHIPPED | OUTPATIENT
Start: 2024-07-03 | End: 2024-07-03

## 2024-07-03 RX ORDER — SULFAMETHOXAZOLE/TRIMETHOPRIM 800-160 MG
1 TABLET ORAL 2 TIMES DAILY
Qty: 20 TABLET | Refills: 0 | Status: SHIPPED | OUTPATIENT
Start: 2024-07-03

## 2024-07-03 RX ORDER — ASPIRIN 325 MG
650 TABLET, DELAYED RELEASE (ENTERIC COATED) ORAL EVERY 4 HOURS PRN
COMMUNITY

## 2024-07-03 RX ORDER — SULFAMETHOXAZOLE/TRIMETHOPRIM 800-160 MG
1 TABLET ORAL ONCE
Status: COMPLETED | OUTPATIENT
Start: 2024-07-03 | End: 2024-07-03

## 2024-07-03 RX ORDER — SULFAMETHOXAZOLE/TRIMETHOPRIM 800-160 MG
1 TABLET ORAL 2 TIMES DAILY
Qty: 20 TABLET | Refills: 0 | Status: SHIPPED | OUTPATIENT
Start: 2024-07-03 | End: 2024-07-03

## 2024-07-03 RX ORDER — CLINDAMYCIN HCL 150 MG
300 CAPSULE ORAL ONCE
Status: COMPLETED | OUTPATIENT
Start: 2024-07-03 | End: 2024-07-03

## 2024-07-03 RX ORDER — CLINDAMYCIN HCL 300 MG
300 CAPSULE ORAL 3 TIMES DAILY
Qty: 30 CAPSULE | Refills: 0 | Status: SHIPPED | OUTPATIENT
Start: 2024-07-03

## 2024-07-03 RX ORDER — IOPAMIDOL 755 MG/ML
180 INJECTION, SOLUTION INTRAVASCULAR ONCE
Status: COMPLETED | OUTPATIENT
Start: 2024-07-03 | End: 2024-07-03

## 2024-07-03 RX ADMIN — CLINDAMYCIN HYDROCHLORIDE 300 MG: 150 CAPSULE ORAL at 10:43

## 2024-07-03 RX ADMIN — IOPAMIDOL 180 ML: 755 INJECTION, SOLUTION INTRAVENOUS at 12:35

## 2024-07-03 RX ADMIN — SULFAMETHOXAZOLE AND TRIMETHOPRIM 1 TABLET: 800; 160 TABLET ORAL at 10:42

## 2024-07-03 ASSESSMENT — ACTIVITIES OF DAILY LIVING (ADL)
ADLS_ACUITY_SCORE: 35
ADLS_ACUITY_SCORE: 33
ADLS_ACUITY_SCORE: 35

## 2024-07-03 ASSESSMENT — COLUMBIA-SUICIDE SEVERITY RATING SCALE - C-SSRS
6. HAVE YOU EVER DONE ANYTHING, STARTED TO DO ANYTHING, OR PREPARED TO DO ANYTHING TO END YOUR LIFE?: NO
1. IN THE PAST MONTH, HAVE YOU WISHED YOU WERE DEAD OR WISHED YOU COULD GO TO SLEEP AND NOT WAKE UP?: NO
2. HAVE YOU ACTUALLY HAD ANY THOUGHTS OF KILLING YOURSELF IN THE PAST MONTH?: NO

## 2024-07-03 NOTE — Clinical Note
True Wakefield was seen and treated in our emergency department on 7/3/2024.  He may return to work on 07/04/2024.       If you have any questions or concerns, please don't hesitate to call.      Deborah Helton MD

## 2024-07-03 NOTE — ED PROVIDER NOTES
EMERGENCY DEPARTMENT ENCOUNTER      NAME: True Wakefield  AGE: 64 year old male  YOB: 1960  MRN: [id number]  EVALUATION DATE & TIME: 7/3/2024  9:58 AM    PCP: Silvia Potts    ED PROVIDER: Deborah Helton M.D.      Chief Complaint   Patient presents with    Leg Pain    Leg Swelling         FINAL IMPRESSION:  1. Cellulitis of left ankle    2. Venous incompetence          ED COURSE & MEDICAL DECISION MAKING:    ED Course as of 07/03/24 1512   Wed Jul 03, 2024   1015 I met with patient for initial interview and encounter. We also discussed plan for treatment and diagnostic interventions.   1023 Pt with h/o PCN anaphylaxis here with left toes dusky, right lower extremity swelling with superimposed rihgt medial malleolar chronic appearing wound with redness and induration with likely developing cellulitis, pending US bilateral LE arterial and venous to r/o DVT and arterial obstruction, CTA to ensure no external higher compression, will treat wtih bactrim/clinda for dual coverage with PCN anaphylaxis history and reassess, patient amenable to plan.   1249 CBC, chemistry, lactic acid and TSH and magnesium WNL reassuringly.    1250 US reassuringly without DVT present, patient in CT now using runoff instead of arterial US   1430 I called [facility] re: CT not yet read and done 2h ago to ensure read occurs and they notes it was accidentally put on the wrong list, she will put it on the list to read today.   1438 Pt reassessed, feeling well, ok with plan if CT is normal to trial compression stockings and to f/u with vascular surgery re: chronic ongoing right lower extremity edema   1510 CT with marked subcutaneous edema suggestive of venous incompetence, ok with plan to begin compression stocking and f/u with vascular. Beginning Rx abx for superimposed cellulitis. Patient discharged after being provided with extensive anticipatory guidance and given return precautions, importance of PMD follow-up  emphasized.      Pertinent Labs & Imaging studies reviewed. (See chart for details)    At the conclusion of the encounter I discussed the results of all of the tests and the disposition. The questions were answered. The patient or family acknowledged understanding and was agreeable with the care plan.     MEDICATIONS GIVEN IN THE EMERGENCY:  Medications   sulfamethoxazole-trimethoprim (BACTRIM DS) 800-160 MG per tablet 1 tablet (1 tablet Oral $Given 7/3/24 1042)   clindamycin (CLEOCIN) capsule 300 mg (300 mg Oral $Given 7/3/24 1043)   iopamidol (ISOVUE-370) solution 180 mL (180 mLs Intravenous $Given 7/3/24 1235)       NEW PRESCRIPTIONS STARTED AT TODAY'S ER VISIT  New Prescriptions    CLINDAMYCIN (CLEOCIN) 300 MG CAPSULE    Take 1 capsule (300 mg) by mouth 3 times daily    SULFAMETHOXAZOLE-TRIMETHOPRIM (BACTRIM DS) 800-160 MG TABLET    Take 1 tablet by mouth 2 times daily        =================================================================    HPI    True Wakefield is a 64 year old male with PMHx of PCN anaphylaxis who presents to the ED today via private car solo for evaluation of leg swelling/redness.    Patient reports a 4-month history of worsening right ankle/shin swelling with redness.  More acutely, his right shin has had open sores that have been draining.  Taking over-the-counter aspirin with relief to the pain.  No recent trauma or official diabetes diagnosis.  Denies calf pain.  No fever.  Left leg doing well. No chest pain, shortness of breath, dysuria, urinary symptoms, abdominal pain, nausea, vomiting, diarrhea, or weight changes.  Non-smoker.  No past surgeries or blood clots.  Denies any known kidney or prostate problems.  No notable family history besides lung cancer in several family members, all smokers.    REVIEW OF SYSTEMS   All other systems reviewed and are negative except as noted above in HPI.    PAST MEDICAL HISTORY:  History reviewed. No pertinent past medical history.    PAST  SURGICAL HISTORY:  History reviewed. No pertinent surgical history.    CURRENT MEDICATIONS:    aspirin (ASA) 325 MG EC tablet  clindamycin (CLEOCIN) 300 MG capsule  sulfamethoxazole-trimethoprim (BACTRIM DS) 800-160 MG tablet  cetirizine (ZYRTEC) 10 MG tablet  Omeprazole 20 MG TBDD  ondansetron (ZOFRAN ODT) 4 MG ODT tab  sodium chloride (OCEAN) 0.65 % nasal spray        ALLERGIES:  Allergies   Allergen Reactions    Amoxicillin-Pot Clavulanate Anaphylaxis and Swelling    Amoxicillin Swelling    Clavulanic Acid Swelling       FAMILY HISTORY:  Family History   Problem Relation Age of Onset    Lung Cancer Mother     Prostate Cancer Father     Emphysema Father        SOCIAL HISTORY:   Social History     Socioeconomic History    Marital status: Single   Tobacco Use    Smoking status: Former     Types: Cigarettes    Smokeless tobacco: Never   Vaping Use    Vaping status: Never Used   Substance and Sexual Activity    Alcohol use: Yes     Comment: occ    Drug use: Never    Sexual activity: Yes     Partners: Female     Social Determinants of Health     Financial Resource Strain: Low Risk  (11/23/2023)    Financial Resource Strain     Within the past 12 months, have you or your family members you live with been unable to get utilities (heat, electricity) when it was really needed?: No   Food Insecurity: Low Risk  (11/23/2023)    Food Insecurity     Within the past 12 months, did you worry that your food would run out before you got money to buy more?: No     Within the past 12 months, did the food you bought just not last and you didn t have money to get more?: No   Transportation Needs: Low Risk  (11/23/2023)    Transportation Needs     Within the past 12 months, has lack of transportation kept you from medical appointments, getting your medicines, non-medical meetings or appointments, work, or from getting things that you need?: No   Interpersonal Safety: Low Risk  (11/24/2023)    Interpersonal Safety     Do you feel  "physically and emotionally safe where you currently live?: Yes     Within the past 12 months, have you been hit, slapped, kicked or otherwise physically hurt by someone?: No     Within the past 12 months, have you been humiliated or emotionally abused in other ways by your partner or ex-partner?: No   Housing Stability: Low Risk  (11/23/2023)    Housing Stability     Do you have housing? : Yes     Are you worried about losing your housing?: No       VITALS:  Patient Vitals for the past 24 hrs:   BP Temp Temp src Pulse Resp SpO2 Height Weight   07/03/24 1330 (!) 146/77 -- -- -- -- -- -- --   07/03/24 1300 (!) 146/78 -- -- -- -- -- -- --   07/03/24 1217 (!) 176/90 -- -- -- -- -- -- --   07/03/24 0951 (!) 175/86 98.6  F (37  C) Oral 75 18 98 % 1.905 m (6' 3\") 118.4 kg (261 lb)       PHYSICAL EXAM    GENERAL: Awake, alert.  In no acute distress.   HEENT: Normocephalic, atraumatic.  Pupils equal, round and reactive.  Conjunctiva normal.  EOMI.  NECK: No stridor or apparent deformity.  PULMONARY: Symmetrical breath sounds without distress.  Lungs clear to auscultation bilaterally without wheezes, rhonchi or rales.  CARDIO: Regular rate and rhythm.  No significant murmur, rub or gallop.  Radial pulses strong and symmetrical.  ABDOMINAL: Abdomen soft, non-distended and non-tender to palpation.  No CVAT, no palpable hepatosplenomegaly.  EXTREMITIES: Bilateral lower extremity edema (R>L). Dusky, skin discoloration in all left toes. <2 seconds capillary refill. Right medial ankle with redness, induration, and crusting.  Bilateral pedal pulses palpable.  NEURO: Alert and oriented to person, place and time.  Cranial nerves grossly intact.  No focal motor deficit.  PSYCH: Normal mood and affect  SKIN: No rashes      LAB:  All pertinent labs reviewed and interpreted.  Results for orders placed or performed during the hospital encounter of 07/03/24   US Lower Extremity Venous Duplex Bilateral    Impression    IMPRESSION:  1.  No " deep venous thrombosis in the bilateral lower extremities.   CTA Chest Abdomen Pelvis Runoff w Contrast    Impression    IMPRESSION:  1. Intact arterial blood flow in both lower extremities. Runoff arteries in the right lower extremity difficult to assess given venous contamination.  2. Subcutaneous edema throughout the right lower leg with multiple and enlarged superficial veins raising suspicion for venous incompetence, perhaps as related to underlying wound overlying the medial malleolus, clinical correlation necessary.  3. Ascending thoracic aorta measures up to 4.5 cm AP by 4.6 cm transverse, aneurysmal. Abdominal aorta normal in size.  4. Scattered and nonspecific hepatic and renal hypodensities, too small to adequately characterize.     Extra Blood Culture Bottle   Result Value Ref Range    Hold Specimen JIC    Extra Blue Top Tube   Result Value Ref Range    Hold Specimen JIC    Extra Green Top (Lithium Heparin) Tube   Result Value Ref Range    Hold Specimen JIC    Extra Purple Top Tube   Result Value Ref Range    Hold Specimen JIC    Extra Green Top (Lithium Heparin) ON ICE   Result Value Ref Range    Hold Specimen JIC    Basic metabolic panel   Result Value Ref Range    Sodium 141 135 - 145 mmol/L    Potassium 4.0 3.4 - 5.3 mmol/L    Chloride 106 98 - 107 mmol/L    Carbon Dioxide (CO2) 27 22 - 29 mmol/L    Anion Gap 8 7 - 15 mmol/L    Urea Nitrogen 14.8 8.0 - 23.0 mg/dL    Creatinine 0.79 0.67 - 1.17 mg/dL    GFR Estimate >90 >60 mL/min/1.73m2    Calcium 10.5 (H) 8.8 - 10.2 mg/dL    Glucose 97 70 - 99 mg/dL   Result Value Ref Range    Magnesium 2.2 1.7 - 2.3 mg/dL   TSH with free T4 reflex   Result Value Ref Range    TSH 1.31 0.30 - 4.20 uIU/mL   Lactic acid whole blood with 1x repeat in 2 hr when >2   Result Value Ref Range    Lactic Acid, Initial 1.1 0.7 - 2.0 mmol/L   CBC with platelets and differential   Result Value Ref Range    WBC Count 4.8 4.0 - 11.0 10e3/uL    RBC Count 4.49 4.40 - 5.90 10e6/uL     Hemoglobin 13.2 (L) 13.3 - 17.7 g/dL    Hematocrit 40.4 40.0 - 53.0 %    MCV 90 78 - 100 fL    MCH 29.4 26.5 - 33.0 pg    MCHC 32.7 31.5 - 36.5 g/dL    RDW 14.3 10.0 - 15.0 %    Platelet Count 152 150 - 450 10e3/uL    % Neutrophils 70 %    % Lymphocytes 20 %    % Monocytes 7 %    % Eosinophils 2 %    % Basophils 1 %    % Immature Granulocytes 0 %    NRBCs per 100 WBC 0 <1 /100    Absolute Neutrophils 3.4 1.6 - 8.3 10e3/uL    Absolute Lymphocytes 1.0 0.8 - 5.3 10e3/uL    Absolute Monocytes 0.3 0.0 - 1.3 10e3/uL    Absolute Eosinophils 0.1 0.0 - 0.7 10e3/uL    Absolute Basophils 0.0 0.0 - 0.2 10e3/uL    Absolute Immature Granulocytes 0.0 <=0.4 10e3/uL    Absolute NRBCs 0.0 10e3/uL       RADIOLOGY:  Reviewed all pertinent imaging. Please see official radiology report.  CTA Chest Abdomen Pelvis Runoff w Contrast   Preliminary Result   IMPRESSION:   1. Intact arterial blood flow in both lower extremities. Runoff arteries in the right lower extremity difficult to assess given venous contamination.   2. Subcutaneous edema throughout the right lower leg with multiple and enlarged superficial veins raising suspicion for venous incompetence, perhaps as related to underlying wound overlying the medial malleolus, clinical correlation necessary.   3. Ascending thoracic aorta measures up to 4.5 cm AP by 4.6 cm transverse, aneurysmal. Abdominal aorta normal in size.   4. Scattered and nonspecific hepatic and renal hypodensities, too small to adequately characterize.         US Lower Extremity Venous Duplex Bilateral   Final Result   IMPRESSION:   1.  No deep venous thrombosis in the bilateral lower extremities.              I, Morteza Long, am serving as a scribe to document services personally performed by Dr. Deborah Helton based on my observation and the provider's statements to me. I, Deborah Helton MD attest that Morteza Long is acting in a scribe capacity, has observed my performance of the services and has documented them in  accordance with my direction.       Deborah Helton MD  07/03/24 [id number]

## 2024-07-03 NOTE — ED TRIAGE NOTES
"Pt is ED security staff member. Pt having Rt lower leg swelling/redness/ and lesions with scant drainage for 4 months.    Pt taking aspirin and aleve q 4 hours to reduce pain, \"feels like a hot poker sticking me if I dont take the pain relivers.\"    Pt has not had recent bloodwork, PCP has left area, no known DM.     Triage Assessment (Adult)       Row Name 07/03/24 0953          Triage Assessment    Airway WDL WDL        Respiratory WDL    Respiratory WDL WDL        Skin Circulation/Temperature WDL    Skin Circulation/Temperature WDL WDL        Cardiac WDL    Cardiac WDL WDL        Peripheral/Neurovascular WDL    Peripheral Neurovascular WDL WDL        Cognitive/Neuro/Behavioral WDL    Cognitive/Neuro/Behavioral WDL WDL                     "

## 2024-07-08 ENCOUNTER — TELEPHONE (OUTPATIENT)
Dept: VASCULAR SURGERY | Facility: CLINIC | Age: 64
End: 2024-07-08
Payer: COMMERCIAL

## 2024-07-08 LAB
BACTERIA BLD CULT: NO GROWTH
BACTERIA BLD CULT: NO GROWTH

## 2024-07-08 NOTE — TELEPHONE ENCOUNTER
Vascular Referral Intake    Referred by: Dr. Deborah Helton for Venous incompetence/right leg swelling    Specialty:     Specific Provider if Necessary:      Visit Type:     Time Frame:     Testing/Imaging Needed Before Consult:     Appt Note: (to be pasted into appt comments, also add where additional information is located, ie, outside images pushed to PACS, in Epic, [facility], etc)      RLE swelling with redness, has had open sores, per notes they are crusted. Also has 4.6 cm ascending thoracic aortic aneursym.

## 2024-07-23 ENCOUNTER — OFFICE VISIT (OUTPATIENT)
Dept: VASCULAR SURGERY | Facility: CLINIC | Age: 64
End: 2024-07-23
Attending: EMERGENCY MEDICINE
Payer: COMMERCIAL

## 2024-07-23 VITALS
RESPIRATION RATE: 18 BRPM | SYSTOLIC BLOOD PRESSURE: 144 MMHG | HEART RATE: 64 BPM | WEIGHT: 260.4 LBS | DIASTOLIC BLOOD PRESSURE: 40 MMHG | TEMPERATURE: 98.6 F | BODY MASS INDEX: 32.55 KG/M2

## 2024-07-23 DIAGNOSIS — I71.21 ANEURYSM OF ASCENDING AORTA WITHOUT RUPTURE (H): ICD-10-CM

## 2024-07-23 DIAGNOSIS — I89.0 SECONDARY LYMPHEDEMA: ICD-10-CM

## 2024-07-23 DIAGNOSIS — I83.018 VENOUS STASIS ULCER OF OTHER PART OF RIGHT LOWER LEG WITH FAT LAYER EXPOSED WITH VARICOSE VEINS (H): Primary | ICD-10-CM

## 2024-07-23 DIAGNOSIS — I87.2 VENOUS INCOMPETENCE: ICD-10-CM

## 2024-07-23 DIAGNOSIS — L97.812 VENOUS STASIS ULCER OF OTHER PART OF RIGHT LOWER LEG WITH FAT LAYER EXPOSED WITH VARICOSE VEINS (H): Primary | ICD-10-CM

## 2024-07-23 DIAGNOSIS — I87.303 VENOUS HYPERTENSION OF BOTH LOWER EXTREMITIES: ICD-10-CM

## 2024-07-23 DIAGNOSIS — I87.2 VENOUS INSUFFICIENCY: ICD-10-CM

## 2024-07-23 PROCEDURE — 99215 OFFICE O/P EST HI 40 MIN: CPT | Mod: 25 | Performed by: NURSE PRACTITIONER

## 2024-07-23 PROCEDURE — 11042 DBRDMT SUBQ TIS 1ST 20SQCM/<: CPT | Performed by: NURSE PRACTITIONER

## 2024-07-23 PROCEDURE — 99204 OFFICE O/P NEW MOD 45 MIN: CPT | Mod: 25 | Performed by: NURSE PRACTITIONER

## 2024-07-23 RX ORDER — TRIAMCINOLONE ACETONIDE 1 MG/G
CREAM TOPICAL
Qty: 45 G | Refills: 2 | Status: SHIPPED | OUTPATIENT
Start: 2024-07-25

## 2024-07-23 RX ORDER — LIDOCAINE 50 MG/G
OINTMENT TOPICAL DAILY PRN
Status: ACTIVE | OUTPATIENT
Start: 2024-07-23

## 2024-07-23 RX ORDER — COVID-19 ANTIGEN TEST
220 KIT MISCELLANEOUS 2 TIMES DAILY WITH MEALS
COMMUNITY

## 2024-07-23 RX ADMIN — LIDOCAINE: 50 OINTMENT TOPICAL at 08:27

## 2024-07-23 ASSESSMENT — PAIN SCALES - GENERAL: PAINLEVEL: MODERATE PAIN (4)

## 2024-07-23 NOTE — PATIENT INSTRUCTIONS
"Make appt with PCP for annual physicial    Referral sent for cardiology due to ascending thoracic aneurysm found on CTA    Order entered for venous insufficiency ultrasound    Referral sent for [facility] to get colonoscopy    I prescribed Triamcinolone cream please bring to each clinic appt    Wound care supplies were ordered today through [facility] and if you are not receiving your supplies or have a question on your bill please contact Liz Rae at [phone]. Please allow 2-5 business days for delivery of supplies. You may get a call from a [phone] if there are additional information [facility] needs. It is important to  or return their call. PLEASE NOTE: If you need to return your supplies, you MUST call customer service within 15 days of delivery date.         Wound Care Instructions    2 TIMES PER WEEK and as needed, Cleanse your right leg and right leg wound(s) with Dilute hibiclens 30cc in 500cc NS.    Pat Dry with non-sterile gauze    Apply Lotion to the intact skin surrounding your wound and other dry skin locations. Some good lotions include: Remedy Skin Repair Cream, Sarna, Vanicream or Cetaphil    Apply Triamcinolone cream to the rash areas on the right leg    Primary Dressing: Apply silvercel into/onto the wounds    Secondary dressing: Cover with gauze    Secure with non-sterile roll gauze (4\" x 75\" roll) and tape (1\" roll tape) as needed; avoid adhesive directly on the skin    Compression: 2 layer on the right; compression stocking on the left    It is not ok to get your wound wet in the bath or shower      If for some reason you are not able to get your dressing(s) changed as outlined above (due to illness, lack of supplies, lack of help) please do the following: remove old, soiled dressings; wash the wounds with saline; pat dry; apply ABD pad or other absorbant pad and secure with rolled gauze; avoid tape directly on your skin; Call the clinic as soon as possible to let us know what the current " issues are in receiving wound care [phone].      SEEK MEDICAL CARE IF:  You have an increase in swelling, pain, or redness around the wound.  You have an increase in the amount of pus coming from the wound.  There is a bad smell coming from the wound.  The wound appears to be worsening/enlarging  You have a fever greater than 101.5 F      It is ok to continue current wound care treatment/products for the next 2-3 days until new wound care supplies are ordered and arrive. If longer than this please contact our office at [phone].    If you have a 2 layer or 4 layer compression wrap on these are safe to have on for ONLY 7 days. If for some reason you are not able to get the wrap(s) changed (due to illness; lack of supplies, lack of help, lack of transportation) please do the following: unwrap the old 2 or 4 layer compression wrap; avoid using scissors as you could cut your skin and cause wounds; use tubular compression when available. Call to reschedule your home care or clinic visit appointment as soon as possible.    Please NOTE: if you are 15 minutes late to your clinic appointment you will have to be rescheduled. Please call our clinic as soon as possible if you know you will not be able to get to your appointment at [phone].    If you fail to show up to 3 scheduled clinic appointments you will be dismissed from our clinic.              We want to hear from you!  In the next few weeks, you should receive a call or email to complete a survey about your visit at [facility]. Please help us improve your appointment experience by letting us know how we did today. We strive to make your experience good and value any ways in which we could do better.      We value your input and suggestions.    Thank you for choosing the [facility]!           It is recommended that you do not get your ulcer wet when showering.  Listed below are several ways of keeping it dry when you  shower.     1. Wrap it with Press and Seal plastic wrap.  It can be found in the stores where the plastic wraps or tin foil is kept.               2.  Some people take a bath and hang their leg/foot out of the tub.                        3  Put your leg in a plastic bag and tape it on.           4. You can purchase a shower cover for casts at some pharmacies and through the Internet.            5. Take a Bed Bath or wash up at the sink

## 2024-07-23 NOTE — PROGRESS NOTES
[facility] Consult Note    Date of Service: July 23, 2024     Requesting Provider: Dr. Deborah Helton    Chief Complaint: BLE swelling and ulcers    History of Present Illness: True Wakefield is being seen at [facility] today regarding BLE swelling and ulcers. They arrive to the clinic today alone; he was able to walk independently to the room. The patient reports that he has had 5 months of worsening right ankle swelling and redness; was seen in the ER and prescribed Bactrim and clindamycin he has completed these and tolerated well. Ultrasound was done no DVT. CTA done and found to have intact arterial blood flow in both lower extremities; found to have ascending thoracic aneurysm. Was currently/previously using vaseline; no cover dressings. Has been getting wet in the shower. Reports pain of 3-4/10; currently using aleve and mahin asa for pain. Has used compression stockings as compression in the past, is currently using compression stockings for compression. He has been wearing these much more consistently since the cellulitis. Denies any fevers, chills, or generalized ill feeling. Denies history of cancer. Sleeps in a bed with legs elevated.  Denies history of DVT, Joint Replacement, and Vein Procedures. Positive history of Cellulitis. I personally reviewed outside imaging, lab work, and progress noted through Care Everywhere and outside records. Non-smoker.  He is currently employed with [employer].       Review of Systems:   Constitutional:  Negative   EENTM: positive for glasses;  negative [place]  GI:  negative for nausea/vomiting;   negative for constipation   negative diarrhea;   negative for fecal incontinence  negative weight loss  :   negative dysuria,  negative incontinence  MS: patient is ambulatory;  does not use assistive devices  Cardiac: ascending thoracic aneurysm found on CTA  Respiratory:  negative SOB  Endocrine:  negative  diabetes  Psych:  negative  depression/anxiety    Past Medical History:   Past Medical History:   Diagnosis Date    Overweight 08/29/2022        Surgical History: History reviewed. No pertinent surgical history.  Denies any surgeries.    Medications:   Current Outpatient Medications   Medication Sig Dispense Refill    aspirin (ASA) 325 MG EC tablet Take 650 mg by mouth every 4 hours as needed for moderate pain      cetirizine (ZYRTEC) 10 MG tablet Take 10 mg by mouth daily      naproxen sodium 220 MG capsule Take 220 mg by mouth 2 times daily (with meals)      Omeprazole 20 MG TBDD Take 20 mg by mouth daily      sodium chloride (OCEAN) 0.65 % nasal spray Spray 1 spray into both nostrils daily as needed for congestion      [START ON 7/25/2024] triamcinolone (KENALOG) 0.1 % external cream Apply topically twice a week 45 g 2    clindamycin (CLEOCIN) 300 MG capsule Take 1 capsule (300 mg) by mouth 3 times daily (Patient not taking: Reported on 7/23/2024) 30 capsule 0    ondansetron (ZOFRAN ODT) 4 MG ODT tab Take 1 tablet (4 mg) by mouth every 8 hours as needed for nausea (Patient not taking: Reported on 7/23/2024) 12 tablet 0    sulfamethoxazole-trimethoprim (BACTRIM DS) 800-160 MG tablet Take 1 tablet by mouth 2 times daily (Patient not taking: Reported on 7/23/2024) 20 tablet 0     Current Facility-Administered Medications   Medication Dose Route Frequency Provider Last Rate Last Admin    lidocaine (XYLOCAINE) 5 % ointment   Topical Daily PRN Mariama Matthews NP   Given at 07/23/24 0827       Allergies:   Allergies   Allergen Reactions    Amoxicillin-Pot Clavulanate Anaphylaxis and Swelling    Amoxicillin Swelling    Clavulanic Acid Swelling       Family history:   Family History   Problem Relation Age of Onset    Lung Cancer Mother     Prostate Cancer Father     Emphysema Father         Social History:   Social History     Socioeconomic History    Marital status: Single     Spouse name: Not on file    Number of children: Not on file     Years of education: Not on file    Highest education level: Not on file   Occupational History    Not on file   Tobacco Use    Smoking status: Former     Types: Cigarettes    Smokeless tobacco: Never   Vaping Use    Vaping status: Never Used   Substance and Sexual Activity    Alcohol use: Yes     Comment: occ    Drug use: Never    Sexual activity: Yes     Partners: Female   Other Topics Concern    Not on file   Social History Narrative    Not on file     Social Determinants of Health     Financial Resource Strain: Low Risk  (11/23/2023)    Financial Resource Strain     Within the past 12 months, have you or your family members you live with been unable to get utilities (heat, electricity) when it was really needed?: No   Food Insecurity: Low Risk  (11/23/2023)    Food Insecurity     Within the past 12 months, did you worry that your food would run out before you got money to buy more?: No     Within the past 12 months, did the food you bought just not last and you didn t have money to get more?: No   Transportation Needs: Low Risk  (11/23/2023)    Transportation Needs     Within the past 12 months, has lack of transportation kept you from medical appointments, getting your medicines, non-medical meetings or appointments, work, or from getting things that you need?: No   Physical Activity: Not on file   Stress: Not on file   Social Connections: Not on file   Interpersonal Safety: Low Risk  (11/24/2023)    Interpersonal Safety     Do you feel physically and emotionally safe where you currently live?: Yes     Within the past 12 months, have you been hit, slapped, kicked or otherwise physically hurt by someone?: No     Within the past 12 months, have you been humiliated or emotionally abused in other ways by your partner or ex-partner?: No   Housing Stability: Low Risk  (11/23/2023)    Housing Stability     Do you have housing? : Yes     Are you worried about losing your housing?: No        Physical Exam  Vitals: BP (!)  144/40   Pulse 64   Temp 98.6  F (37  C)   Resp 18   Wt 260 lb 6.4 oz (118.1 kg)   BMI 32.55 kg/m    Weight is 260 lbs 6.4 oz          Body mass index is 32.55 kg/m .  General: This is a 64 year old male who appears their reported age, not in acute distress  Head: normocephalic, Atraumatic; wearing glasses; non-icteric sclera; no exudate; mild hearing loss   Respiratory: Clear throughout all lung fields; unlabored breathing; no cough   Cardiac: Regular, Rate and Rhythm, no murmurs appreciated   Skin: Uniformly warm and dry  Psych: Alert and oriented x4; calm and cooperative throughout exam  Abdomen: Normal bowel sounds. Soft, symmetric, no guarding or rigidity, nontender with palpation.  No organomegaly or masses palpated.   Lymphatics: No palpable nodes to bilateral groin  Extremities: right leg with +3 pitting edema Wound #1 Location: right medial leg  Size: 1L x 1W x 0.2depth.  no sinus tract present, Wound base: red  no undermining present. Wound is full thickness. There is moderate drainage. Periwound: no denudement, +erythema, induration, maceration or warmth.  Extensive surrounding rash   strength testing revealed 4/4 to BLEs.    Sensation: Intact to pinprick and light touch throughout lower extremities bilaterally       Peripheral Vascular:   Good capillary refill. No unusual venous distention. Positive for spider veins, telangiectasias, hemosiderin deposition or hyperpigmentation, and fibrosis or scarring       Circumferential volume measures:            7/23/2024     8:00 AM   Circumferential Measures   Right just above MTP 30   Right Ankle 30.3   Right Widest Calf 41.7   Left - just above MTP 27   Left Ankle 27   Left Widest Calf 40.3       Ulceration(s)/Wound(s):     VASC Wound Rt medial leg (Active)   Pre Size Length 0.6 07/23/24 0800   Pre Size Width 0.5 07/23/24 0800   Pre Size Depth 0.1 07/23/24 0800   Pre Total Sq cm 0.3 07/23/24 0800       VASC Wound Rt medial leg (Active)   Pre Size Length  "0.6 07/23/24 0900   Pre Size Width 0.5 07/23/24 0900   Pre Size Depth 0.1 07/23/24 0900   Pre Total Sq cm 0.3 07/23/24 0900   Post Size Length 1 07/23/24 0900   Post Size Width 1 07/23/24 0900   Post Size Depth 0.2 07/23/24 0900   Post Total Sq cm 1 07/23/24 0900       Laboratory studies:     I personally reviewed the following lab results today and those on care everywhere, if indicated     No results found for: \"CRP\"   No results found for: \"SED\"   Last Renal Panel:  Sodium   Date Value Ref Range Status   07/03/2024 141 135 - 145 mmol/L Final     Potassium   Date Value Ref Range Status   07/03/2024 4.0 3.4 - 5.3 mmol/L Final     Chloride   Date Value Ref Range Status   07/03/2024 106 98 - 107 mmol/L Final     Carbon Dioxide (CO2)   Date Value Ref Range Status   07/03/2024 27 22 - 29 mmol/L Final     Anion Gap   Date Value Ref Range Status   07/03/2024 8 7 - 15 mmol/L Final     Glucose   Date Value Ref Range Status   07/03/2024 97 70 - 99 mg/dL Final     GLUCOSE BY METER POCT   Date Value Ref Range Status   11/05/2023 112 (H) 70 - 99 mg/dL Final     Urea Nitrogen   Date Value Ref Range Status   07/03/2024 14.8 8.0 - 23.0 mg/dL Final     Creatinine   Date Value Ref Range Status   07/03/2024 0.79 0.67 - 1.17 mg/dL Final     GFR Estimate   Date Value Ref Range Status   07/03/2024 >90 >60 mL/min/1.73m2 Final     Comment:     eGFR calculated using 2021 CKD-EPI equation.     Calcium   Date Value Ref Range Status   07/03/2024 10.5 (H) 8.8 - 10.2 mg/dL Final      Lab Results   Component Value Date    WBC 4.8 07/03/2024     Lab Results   Component Value Date    RBC 4.49 07/03/2024     Lab Results   Component Value Date    HGB 13.2 07/03/2024     Lab Results   Component Value Date    HCT 40.4 07/03/2024     No components found for: \"MCT\"  Lab Results   Component Value Date    MCV 90 07/03/2024     Lab Results   Component Value Date    [facility] 29.4 07/03/2024     Lab Results   Component Value Date    [facility] 32.7 07/03/2024     Lab " "Results   Component Value Date    RDW 14.3 07/03/2024     Lab Results   Component Value Date     07/03/2024      No results found for: \"A1C\"   TSH   Date Value Ref Range Status   07/03/2024 1.31 0.30 - 4.20 uIU/mL Final      No results found for: \"VITDT\"       Impression:    Encounter Diagnoses   Name Primary?    Venous stasis ulcer of other part of right lower leg with fat layer exposed with varicose veins (H) Yes    Venous incompetence     Aneurysm of ascending aorta without rupture (H24)     Venous insufficiency     Venous hypertension of both lower extremities     Secondary lymphedema            Assessment/Plan:  1. Debridement: Nursing staff remove the old dressing and cleanse the wound and surrounding skin with recommended solution. After discussion of risk factors and verbal consent was obtained 2% Lidocaine HCL jelly was applied, under clean conditions, the right leg ulceration(s) were debrided using currette. Devitalized and nonviable tissue, along with any fibrin and slough, was removed to improve granulation tissue formation, stimulate wound healing, decrease overall bacteria load, disrupt biofilm formation and decrease edge senescence.  Total excisional debridement was 1 sq cm from the epidermis/dermis area or into the subcutaneous tissue with a depth of 0.2 cm.   Ulcers were improved afterwards and .  Measures were as noted on the flow sheet.    2. Treatment: wound treatment will include irrigation and dressings to promote autolytic debridement which will include: will use silvercel; gauze; rolled gauze; change twice per week in clinic.    If for some reason the patient is not able to get your dressing(s) changed as outlined above (due to illness, lack of supplies, lack of help) please do the following: remove old, soiled dressings; wash the ulcers with saline; pat dry; apply ABD pad or other absorbant pad and secure with rolled gauze; avoid tape directly on your skin; Patient instructed " to call the clinic as soon as possible to let us know what the current issues are in receiving ulcer care.    3. Edema. Will reduce his swelling down with 2 layer; change twice per week in clinic; elevate the legs 4 times per day for 40 minutes above the level of the heart; this will be difficult due to his work; we will obtain venous insufficiency ultrasound to evaluate for valvular incompetence.    If a 2 layer or 4 layer compression wrap is being used; these are safe to have on for ONLY 7 days. If for some reason the patient is not able to get the wrap(s) changed (due to illness; lack of supplies, lack of help, lack of transportation) please do the following: unwrap the old 2 or 4 layer compression wrap; avoid using scissors as you could cut your skin and cause ulcers; use tubular compression when available. Call to reschedule your home care or clinic visit appointment as soon as possible.    4. Offloading: na    5. Nutrition: focus on weight loss; encouraged him to be seen by pcp for diabetes work up    6. Wound Etiology: venous stasis; cellulitis    7. Ascending Thoracic aneurysm: found on CTA done in the ER; will refer to cardiology for surveillance.     8. Colonoscopy screening: having some occasional rectal bleeding; single leg edema; overdue to colonoscopy; will refer to [facility]; this is where he has gone in the past    9. Annual Physical: he is over do for his annual physical he did have an abnormal glucose reading; would be a good idea for routine blood work and evaluate for diabetes as this could be a contributing factor for his cellulitis     Patient to return to clinic in 4 week(s) for re-evaluation. NV twice per week until then. They were instructed to call the clinic sooner with any further questions or concerns. Answered all questions.          Mariama Matthews NP   [facility]  [phone]      This note was electronically signed by Mariama Matthews NP

## 2024-07-23 NOTE — PROGRESS NOTES
"Clinic Administered Medication Documentation    Patient was given Lidocaine. Prior to medication administration, verified patient's identity using patient's name and date of birth.    KANDIS REDDING LPN          Compression Applied to Right  2-Layer [place]: I Applied the inner foam layer with the foot dorsiflexed and started atthe base of the fifth metatarsal head. I left the bottom of the heel exposed, and proceed by winding the foam up the leg using minimal overlap to just below the fibular head. I then applied the compression layer with the foot dorsiflexed and startingat the base of the fifth metatarsal head. I applied at full stretch and proceeded up the leg using 50% overlap. The bottom of the heel is covered with the compression layer up to the end at the fibular head just below the back of the knee and levelwith the top edge of the foam layer.  I gently pressed and conformed the entire surface of the system to ensurethat the two layers are firmly bound together    Compression Applied to Left  Velcro\", Compression Stockings  "

## 2024-07-26 ENCOUNTER — OFFICE VISIT (OUTPATIENT)
Dept: VASCULAR SURGERY | Facility: CLINIC | Age: 64
End: 2024-07-26
Attending: NURSE PRACTITIONER
Payer: COMMERCIAL

## 2024-07-26 VITALS
OXYGEN SATURATION: 96 % | TEMPERATURE: 98.1 F | DIASTOLIC BLOOD PRESSURE: 79 MMHG | HEART RATE: 69 BPM | SYSTOLIC BLOOD PRESSURE: 156 MMHG

## 2024-07-26 DIAGNOSIS — I87.2 VENOUS INSUFFICIENCY: ICD-10-CM

## 2024-07-26 DIAGNOSIS — L97.812 VENOUS STASIS ULCER OF OTHER PART OF RIGHT LOWER LEG WITH FAT LAYER EXPOSED WITH VARICOSE VEINS (H): ICD-10-CM

## 2024-07-26 DIAGNOSIS — I83.018 VENOUS STASIS ULCER OF OTHER PART OF RIGHT LOWER LEG WITH FAT LAYER EXPOSED WITH VARICOSE VEINS (H): ICD-10-CM

## 2024-07-26 DIAGNOSIS — I87.2 VENOUS INCOMPETENCE: Primary | ICD-10-CM

## 2024-07-26 DIAGNOSIS — I87.303 VENOUS HYPERTENSION OF BOTH LOWER EXTREMITIES: ICD-10-CM

## 2024-07-26 DIAGNOSIS — I89.0 SECONDARY LYMPHEDEMA: ICD-10-CM

## 2024-07-26 PROCEDURE — 11042 DBRDMT SUBQ TIS 1ST 20SQCM/<: CPT | Performed by: NURSE PRACTITIONER

## 2024-07-26 ASSESSMENT — PAIN SCALES - GENERAL: PAINLEVEL: NO PAIN (0)

## 2024-07-26 NOTE — PROGRESS NOTES
Follow up Vascular Visit       Date of Service:07/26/24      Chief Complaint: right leg swelling and ulcers      Pt returns to [facility] with regards to their right leg swelling and ulcers.  They arrive today alone. They are currently using silvercel and gauze to the wounds. This is being done by staff at the clinic twice per week. They are using 2 layer for compression. They are feeling well today. Denies fevers, chills. No shortness of breath.     Allergies:   Allergies   Allergen Reactions    Amoxicillin-Pot Clavulanate Anaphylaxis and Swelling    Amoxicillin Swelling    Clavulanic Acid Swelling       Medications:   Current Outpatient Medications:     aspirin (ASA) 325 MG EC tablet, Take 650 mg by mouth every 4 hours as needed for moderate pain, Disp: , Rfl:     cetirizine (ZYRTEC) 10 MG tablet, Take 10 mg by mouth daily, Disp: , Rfl:     naproxen sodium 220 MG capsule, Take 220 mg by mouth 2 times daily (with meals), Disp: , Rfl:     Omeprazole 20 MG TBDD, Take 20 mg by mouth daily, Disp: , Rfl:     sodium chloride (OCEAN) 0.65 % nasal spray, Spray 1 spray into both nostrils daily as needed for congestion, Disp: , Rfl:     triamcinolone (KENALOG) 0.1 % external cream, Apply topically twice a week, Disp: 45 g, Rfl: 2    clindamycin (CLEOCIN) 300 MG capsule, Take 1 capsule (300 mg) by mouth 3 times daily (Patient not taking: Reported on 7/23/2024), Disp: 30 capsule, Rfl: 0    ondansetron (ZOFRAN ODT) 4 MG ODT tab, Take 1 tablet (4 mg) by mouth every 8 hours as needed for nausea (Patient not taking: Reported on 7/23/2024), Disp: 12 tablet, Rfl: 0    sulfamethoxazole-trimethoprim (BACTRIM DS) 800-160 MG tablet, Take 1 tablet by mouth 2 times daily (Patient not taking: Reported on 7/23/2024), Disp: 20 tablet, Rfl: 0    Current Facility-Administered Medications:     lidocaine (XYLOCAINE) 5 % ointment, , Topical, Daily PRN, Mariama Matthews, NP, Given at 07/23/24 0854    History:   Past  "Medical History:   Diagnosis Date    Overweight 08/29/2022       Physical Exam:    BP (!) 156/79   Pulse 69   Temp 98.1  F (36.7  C)   SpO2 96%     General:  Patient presents to clinic in no apparent distress.  Head: normocephalic atraumatic  Psychiatric:  Alert and oriented x3.   Respiratory: unlabored breathing; no cough  Integumentary:  Skin is uniformly warm, dry and pink.    Ulcer #1 Location: right medial ankle  Size: 0.5L x 0.3W x 0.1depth.  no sinus tract present, Wound base: slough  no undermining present. Ulcer is full thickness. There is moderate drainage. Periwound: no denudement, erythema, induration, maceration or warmth.  Less inflammation but continues with rash on medial and lateral ankle.    VASC Wound Rt medial leg (Active)   Pre Size Length 0.6 07/23/24 0800   Pre Size Width 0.5 07/23/24 0800   Pre Size Depth 0.1 07/23/24 0800   Pre Total Sq cm 0.3 07/23/24 0800   Number of days: 3       VASC Wound Rt medial leg (Active)   Pre Size Length 0.5 07/26/24 1500   Pre Size Width 0.3 07/26/24 1500   Pre Size Depth 0.1 07/26/24 1500   Pre Total Sq cm 0.15 07/26/24 1500   Post Size Length 1 07/23/24 0900   Post Size Width 1 07/23/24 0900   Post Size Depth 0.2 07/23/24 0900   Post Total Sq cm 1 07/23/24 0900   Number of days: 3            Circumferential volume measures:          7/23/2024     8:00 AM 7/26/2024     3:00 PM   Circumferential Measures   Right just above MTP 30 26.5   Right Ankle 30.3 30.5   Right Widest Calf 41.7 42   Left - just above MTP 27    Left Ankle 27    Left Widest Calf 40.3        Labs:    I personally reviewed the following lab results today and those on care everywhere    No results found for: \"CRP\"   No results found for: \"SED\"   Last Renal Panel:  Sodium   Date Value Ref Range Status   07/03/2024 141 135 - 145 mmol/L Final     Potassium   Date Value Ref Range Status   07/03/2024 4.0 3.4 - 5.3 mmol/L Final     Chloride   Date Value Ref Range Status   07/03/2024 106 98 - 107 " "mmol/L Final     Carbon Dioxide (CO2)   Date Value Ref Range Status   07/03/2024 27 22 - 29 mmol/L Final     Anion Gap   Date Value Ref Range Status   07/03/2024 8 7 - 15 mmol/L Final     Glucose   Date Value Ref Range Status   07/03/2024 97 70 - 99 mg/dL Final     GLUCOSE BY METER POCT   Date Value Ref Range Status   11/05/2023 112 (H) 70 - 99 mg/dL Final     Urea Nitrogen   Date Value Ref Range Status   07/03/2024 14.8 8.0 - 23.0 mg/dL Final     Creatinine   Date Value Ref Range Status   07/03/2024 0.79 0.67 - 1.17 mg/dL Final     GFR Estimate   Date Value Ref Range Status   07/03/2024 >90 >60 mL/min/1.73m2 Final     Comment:     eGFR calculated using 2021 CKD-EPI equation.     Calcium   Date Value Ref Range Status   07/03/2024 10.5 (H) 8.8 - 10.2 mg/dL Final      Lab Results   Component Value Date    WBC 4.8 07/03/2024     Lab Results   Component Value Date    RBC 4.49 07/03/2024     Lab Results   Component Value Date    HGB 13.2 07/03/2024     Lab Results   Component Value Date    HCT 40.4 07/03/2024     No components found for: \"MCT\"  Lab Results   Component Value Date    MCV 90 07/03/2024     Lab Results   Component Value Date    MCH 29.4 07/03/2024     Lab Results   Component Value Date    MCHC 32.7 07/03/2024     Lab Results   Component Value Date    RDW 14.3 07/03/2024     Lab Results   Component Value Date     07/03/2024      No results found for: \"A1C\"   TSH   Date Value Ref Range Status   07/03/2024 1.31 0.30 - 4.20 uIU/mL Final      No results found for: \"VITDT\"                Impression:  Encounter Diagnoses   Name Primary?    Venous incompetence Yes    Venous insufficiency     Venous hypertension of both lower extremities     Secondary lymphedema     Venous stasis ulcer of other part of right lower leg with fat layer exposed with varicose veins (H)                7/26/2024 right medial leg   7/23/24 right medial leg          Are any of these ulcers new today: No; Location: " na    Assessment/Plan:          1. Debridement: Nursing staff removed the old dressing and cleanse the wound(s) with specified solution. After discussion of risk factors and verbal consent was obtained 2% Lidocaine HCL jelly was applied, under clean conditions, the right medial ankle ulceration(s) were debrided using currette. Devitalized and nonviable tissue, along with any fibrin and slough, was removed to improve granulation tissue formation, stimulate wound healing, decrease overall bacteria load, disrupt biofilm formation and decrease edge senescence.  Total excisional debridement was 0.15 sq cm from the epidermis/dermis area and into the subcutaneous tissue with a depth of 0.1 cm.   Ulcers were improved afterwards and .  Measures were unchanged after debridement.       2.  Ulcer treatment: ulcer treatment will include irrigation and dressings to promote autolytic debridement which will include:will use TCM to the rash; silvercel; gauze; rolled gauze; change twice per week If for some reason the patient is not able to get their dressing(s) changed as outlined above (due to illness, lack of supplies, lack of help) please do the following: remove old, soiled dressings; wash the ulcers with saline; pat dry; apply ABD pad or other absorbant pad and secure with rolled gauze; avoid tape directly on your skin; patient instructed to call the clinic as soon as possible to let us know what the current issues are in receiving ulcer care. Stable            3. Edema: continue 2 layer; elevation; scheduled for upcoming venous study; when healed he will need 20-30mmHG compression stocking or Velcro wrap; ok for nursing staff to transition him . The compression wraps were applied today in clinic.     If a 2 layer or 4 layer compression wrap is being used; these are safe to have on for ONLY 7 days. If for some reason the patient is not able to get the wrap(s) changed (due to illness; lack of supplies, lack of help, lack  of transportation) please do the following: unwrap the old 2 or 4 layer compression wrap; avoid using scissors as you could cut your skin and cause ulcers; use tubular compression when available. Call to reschedule your home care or clinic visit appointment as soon as possible.  Stable            4. Nutrition: focus on low sodium           5. Offloading: na          6. Wound Etiology: venous stasis     Patient will follow up with me in 4 weeks for reevaluation. They were instructed to call the clinic sooner with any signs or symptoms of infection or any further questions/concerns. Answered all questions.          Mariama Matthews DNP, RN, CNP, CWOCN, CFCN, CLT  [facility]   [phone]        This note was electronically signed by Mariama Matthews NP

## 2024-07-26 NOTE — PATIENT INSTRUCTIONS
"Make appt with PCP for annual physicial    Referral sent for cardiology due to ascending thoracic aneurysm found on CTA    Order entered for venous insufficiency ultrasound    Referral sent for [facility] to get colonoscopy    I prescribed Triamcinolone cream please bring to each clinic appt    Wound care supplies were ordered today through [place] and if you are not receiving your supplies or have a question on your bill please contact Liz Rae at [phone]. Please allow 2-5 business days for delivery of supplies. You may get a call from [phone] if there are additional information [place] needs. It is important to  or return their call. PLEASE NOTE: If you need to return your supplies, you MUST call customer service within 15 days of delivery date.         Wound Care Instructions    2 TIMES PER WEEK and as needed, Cleanse your right leg and right leg wound(s) with Dilute hibiclens 30cc in 500cc NS.    Pat Dry with non-sterile gauze    Apply Lotion to the intact skin surrounding your wound and other dry skin locations. Some good lotions include: Remedy Skin Repair Cream, Sarna, Vanicream or Cetaphil    Apply Triamcinolone cream to the rash areas on the right leg    Primary Dressing: Apply gentamicin ointment then silvercel into/onto the wounds    Secondary dressing: Cover with gauze    Secure with non-sterile roll gauze (4\" x 75\" roll) and tape (1\" roll tape) as needed; avoid adhesive directly on the skin    Compression: 2 layer on the right; compression stocking on the left    It is not ok to get your wound wet in the bath or shower      If for some reason you are not able to get your dressing(s) changed as outlined above (due to illness, lack of supplies, lack of help) please do the following: remove old, soiled dressings; wash the wounds with saline; pat dry; apply ABD pad or other absorbant pad and secure with rolled gauze; avoid tape directly on your skin; Call the clinic as soon as possible to let us " know what the current issues are in receiving wound care [phone].      SEEK MEDICAL CARE IF:  You have an increase in swelling, pain, or redness around the wound.  You have an increase in the amount of pus coming from the wound.  There is a bad smell coming from the wound.  The wound appears to be worsening/enlarging  You have a fever greater than 101.5 F      It is ok to continue current wound care treatment/products for the next 2-3 days until new wound care supplies are ordered and arrive. If longer than this please contact our office at [phone].    If you have a 2 layer or 4 layer compression wrap on these are safe to have on for ONLY 7 days. If for some reason you are not able to get the wrap(s) changed (due to illness; lack of supplies, lack of help, lack of transportation) please do the following: unwrap the old 2 or 4 layer compression wrap; avoid using scissors as you could cut your skin and cause wounds; use tubular compression when available. Call to reschedule your home care or clinic visit appointment as soon as possible.    Please NOTE: if you are 15 minutes late to your clinic appointment you will have to be rescheduled. Please call our clinic as soon as possible if you know you will not be able to get to your appointment at [phone].    If you fail to show up to 3 scheduled clinic appointments you will be dismissed from our clinic.              We want to hear from you!  In the next few weeks, you should receive a call or email to complete a survey about your visit at [facility]. Please help us improve your appointment experience by letting us know how we did today. We strive to make your experience good and value any ways in which we could do better.      We value your input and suggestions.    Thank you for choosing the [facility]!           It is recommended that you do not get your ulcer wet when showering.  Listed below are several ways of  keeping it dry when you shower.     1. Wrap it with Press and Seal plastic wrap.  It can be found in the stores where the plastic wraps or tin foil is kept.               2.  Some people take a bath and hang their leg/foot out of the tub.                        3  Put your leg in a plastic bag and tape it on.           4. You can purchase a shower cover for casts at some pharmacies and through the Internet.            5. Take a Bed Bath or wash up at the sink

## 2024-08-02 ENCOUNTER — OFFICE VISIT (OUTPATIENT)
Dept: VASCULAR SURGERY | Facility: CLINIC | Age: 64
End: 2024-08-02
Attending: NURSE PRACTITIONER
Payer: COMMERCIAL

## 2024-08-02 ENCOUNTER — ANCILLARY PROCEDURE (OUTPATIENT)
Dept: VASCULAR ULTRASOUND | Facility: CLINIC | Age: 64
End: 2024-08-02
Attending: NURSE PRACTITIONER
Payer: COMMERCIAL

## 2024-08-02 DIAGNOSIS — I87.2 VENOUS INCOMPETENCE: ICD-10-CM

## 2024-08-02 DIAGNOSIS — I87.303 VENOUS HYPERTENSION OF BOTH LOWER EXTREMITIES: ICD-10-CM

## 2024-08-02 DIAGNOSIS — I83.018 VENOUS STASIS ULCER OF OTHER PART OF RIGHT LOWER LEG WITH FAT LAYER EXPOSED WITH VARICOSE VEINS (H): ICD-10-CM

## 2024-08-02 DIAGNOSIS — L97.812 VENOUS STASIS ULCER OF OTHER PART OF RIGHT LOWER LEG WITH FAT LAYER EXPOSED WITH VARICOSE VEINS (H): ICD-10-CM

## 2024-08-02 DIAGNOSIS — I87.2 VENOUS INSUFFICIENCY: ICD-10-CM

## 2024-08-02 DIAGNOSIS — I89.0 SECONDARY LYMPHEDEMA: ICD-10-CM

## 2024-08-02 DIAGNOSIS — I71.21 ANEURYSM OF ASCENDING AORTA WITHOUT RUPTURE (H): ICD-10-CM

## 2024-08-02 DIAGNOSIS — I83.018 VENOUS STASIS ULCER OF OTHER PART OF RIGHT LOWER LEG WITH FAT LAYER EXPOSED WITH VARICOSE VEINS (H): Primary | ICD-10-CM

## 2024-08-02 DIAGNOSIS — L97.812 VENOUS STASIS ULCER OF OTHER PART OF RIGHT LOWER LEG WITH FAT LAYER EXPOSED WITH VARICOSE VEINS (H): Primary | ICD-10-CM

## 2024-08-02 PROCEDURE — 97602 WOUND(S) CARE NON-SELECTIVE: CPT

## 2024-08-02 PROCEDURE — 93970 EXTREMITY STUDY: CPT

## 2024-08-02 PROCEDURE — 93970 EXTREMITY STUDY: CPT | Mod: 26 | Performed by: SURGERY

## 2024-08-02 NOTE — PROGRESS NOTES
[facility]  -  Nurse Visit        Date of Service:  August 2, 2024     Requesting Provider: BURT Matthews    Diagnosis:     ICD-10-CM    1. Venous stasis ulcer of other part of right lower leg with fat layer exposed with varicose veins (H)  I83.018     L97.812       2. Secondary lymphedema  I89.0           Chief Complaint: True is being seen today at [facility] [place] for his wound(s) and swelling dressing change. Reports pain of 0/10.  Denies any fevers, chills, or generalized ill feeling.     Dressing on Arrival: otto, Pt is currently using 2 layer for compression and did tolerate well. Upon removal of dressings  No drainage is noted.    New Wounds noted: No    Vital Signs: There were no vitals taken for this visit.    Assessment:      General:  Patient presents to clinic in no apparent distress.   Psychiatric:  Alert and oriented x3.   Lower extremity:  edema is present.    Integumentary:  Skin is WNL    Circumferential volume measures:      7/23/2024     8:00 AM 7/26/2024     3:00 PM   Circumferential Measures   Right just above MTP 30 26.5   Right Ankle 30.3 30.5   Right Widest Calf 41.7 42   Left - just above MTP 27    Left Ankle 27    Left Widest Calf 40.3        Wound info:  VASC Wound Rt medial leg (Active)   Pre Size Length 0.6 07/23/24 0800   Pre Size Width 0.5 07/23/24 0800   Pre Size Depth 0.1 07/23/24 0800   Pre Total Sq cm 0.3 07/23/24 0800       VASC Wound Rt medial leg (Active)   Pre Size Length 0.5 07/26/24 1500   Pre Size Width 0.3 07/26/24 1500   Pre Size Depth 0.1 07/26/24 1500   Pre Total Sq cm 0.15 07/26/24 1500   Post Size Length 1 07/23/24 0900   Post Size Width 1 07/23/24 0900   Post Size Depth 0.2 07/23/24 0900   Post Total Sq cm 1 07/23/24 0900       Undermining is not present.    The periwoundskin is WNL      Plan:         1. Patient will return in 1 weeks for nurse visit.            2. As listed below, treatment provided irrigation,  mechanical cleansing, and dressings to promote autolytic debridement and included application of multi-layer compression therapy.             Cleansed with: Dilute hibiclens 30cc in 500cc NS    Protected skin with: Remedy skin repair lotion    Dressings Applied to wound: Gauze    Compression Applied to the right le-Layer Compression  Compression Applied to the left leg: None    2-Layer [place]: I Applied the inner foam layer with the foot dorsiflexed and started atthe base of the fifth metatarsal head. I left the bottom of the heel exposed, and proceed by winding the foam up the leg using minimal overlap to just below the fibular head. I then applied the compression layer with the foot dorsiflexed and startingat the base of the fifth metatarsal head. I applied at full stretch and proceeded up the leg using 50% overlap. The bottom of the heel is covered with the compression layer up to the end at the fibular head just below the back of the knee and levelwith the top edge of the foam layer.  I gently pressed and conformed the entire surface of the system to ensurethat the two layers are firmly bound together    Offloading used: None    Trial Products: No    Provider notified regarding concerns: No    Treatment Changes: No    Tolerated Dressing Change:  Yes    Taught Regarding: follow up appointment(s), wound cares, compression, layered compression wraps - maximum wear time of 7 days, elevation, and compliance    Educational Barriers: No barriers      KANDIS REDDING LPN

## 2024-08-05 NOTE — RESULT ENCOUNTER NOTE
Mariama Matthews, Marko Aponte, RN  Can you forward this to the pool so someone can call the patient and let him know his ultrasound demonstrated severe venous reflux. I would like him to see Dr Cevallos to discuss treatment options.    TAJ

## 2024-08-08 NOTE — PATIENT INSTRUCTIONS
"Make appt with PCP for annual physicial    Referral sent for cardiology due to ascending thoracic aneurysm found on CTA    Order entered for venous insufficiency ultrasound    Referral sent for [facility] to get colonoscopy    I prescribed Triamcinolone cream please bring to each clinic appt    Wound care supplies were ordered today through [place] and if you are not receiving your supplies or have a question on your bill please contact Liz Rae at [phone]. Please allow 2-5 business days for delivery of supplies. You may get a call from [phone] if there are additional information [place] needs. It is important to  or return their call. PLEASE NOTE: If you need to return your supplies, you MUST call customer service within 15 days of delivery date.        Wound Care Instructions    2 TIMES PER WEEK and as needed, Cleanse your right leg and right leg wound(s) with Dilute hibiclens 30cc in 500cc NS.    Pat Dry with non-sterile gauze    Apply Lotion to the intact skin surrounding your wound and other dry skin locations. Some good lotions include: Remedy Skin Repair Cream, Sarna, Vanicream or Cetaphil    Apply Triamcinolone cream to the rash areas on the right leg    Primary Dressing: Apply silvercel into/onto the wounds    Secondary dressing: Cover with gauze    Secure with non-sterile roll gauze (4\" x 75\" roll) and tape (1\" roll tape) as needed; avoid adhesive directly on the skin    Compression: 2 layer on the right; compression stocking on the left    It is not ok to get your wound wet in the bath or shower      If for some reason you are not able to get your dressing(s) changed as outlined above (due to illness, lack of supplies, lack of help) please do the following: remove old, soiled dressings; wash the wounds with saline; pat dry; apply ABD pad or other absorbant pad and secure with rolled gauze; avoid tape directly on your skin; Call the clinic as soon as possible to let us know what the current " issues are in receiving wound care [phone].      SEEK MEDICAL CARE IF:  You have an increase in swelling, pain, or redness around the wound.  You have an increase in the amount of pus coming from the wound.  There is a bad smell coming from the wound.  The wound appears to be worsening/enlarging  You have a fever greater than 101.5 F      It is ok to continue current wound care treatment/products for the next 2-3 days until new wound care supplies are ordered and arrive. If longer than this please contact our office at [phone].    If you have a 2 layer or 4 layer compression wrap on these are safe to have on for ONLY 7 days. If for some reason you are not able to get the wrap(s) changed (due to illness; lack of supplies, lack of help, lack of transportation) please do the following: unwrap the old 2 or 4 layer compression wrap; avoid using scissors as you could cut your skin and cause wounds; use tubular compression when available. Call to reschedule your home care or clinic visit appointment as soon as possible.    Please NOTE: if you are 15 minutes late to your clinic appointment you will have to be rescheduled. Please call our clinic as soon as possible if you know you will not be able to get to your appointment at [phone].    If you fail to show up to 3 scheduled clinic appointments you will be dismissed from our clinic.              We want to hear from you!  In the next few weeks, you should receive a call or email to complete a survey about your visit at [facility]. Please help us improve your appointment experience by letting us know how we did today. We strive to make your experience good and value any ways in which we could do better.      We value your input and suggestions.    Thank you for choosing the [facility]!           It is recommended that you do not get your ulcer wet when showering.  Listed below are several ways of keeping it dry when you  shower.    1. Wrap it with Press and Seal plastic wrap.  It can be found in the stores where the plastic wraps or tin foil is kept.                2.  Some people take a bath and hang their leg/foot out of the tub.                        3  Put your leg in a plastic bag and tape it on.            4. You can purchase a shower cover for casts at some pharmacies and through the Internet.           5. Take a Bed Bath or wash up at the sink

## 2024-08-08 NOTE — PROGRESS NOTES
[facility]  -  Nurse Visit        DAVID      R medial leg    Date of Service:  August 9, 2024     Requesting Provider: BURT Matthews    Diagnosis:     ICD-10-CM    1. Venous stasis ulcer of other part of right lower leg with fat layer exposed with varicose veins (H)  I83.018     L97.812       2. Secondary lymphedema  I89.0       3. Venous insufficiency  I87.2           Chief Complaint: True is being seen today at [facility] [place] for his wound(s) and swelling dressing change. Reports pain of 0 out of 10.  Denies any fevers, chills, or generalized ill feeling.     Dressing on Arrival: 2 layer right leg c/d/I/. Pt is currently using regular 2 layer on the right leg and compression stocking on the left leg for compression and did tolerate well. Upon removal of dressings no drainage is noted. Pt foot measurements are slightly up and pt reports it is more swollen. Wound appears healed and pt told to bring compression stocking with for next visit to possibly transition at that time.    New Wounds noted: No    Vital Signs: BP (!) 176/90   Pulse 61   Temp 98  F (36.7  C) (Oral)   Resp 12     Pt seen by cardiology today and just prescribed amlodipine and to log BP for 3 weeks.     Assessment:      General:  Patient presents to clinic in no apparent distress.   Psychiatric:  Alert and oriented x3.   Lower extremity:  edema is present.    Integumentary:  Skin is hemosiderin    Circumferential volume measures:      7/23/2024     8:00 AM 7/26/2024     3:00 PM 8/9/2024     2:00 PM   Circumferential Measures   Right just above MTP 30 26.5 27.5   Right Ankle 30.3 30.5 25.8   Right Widest Calf 41.7 42 40.7   Left - just above MTP 27     Left Ankle 27     Left Widest Calf 40.3         Wound info:  VASC Wound Rt medial leg (Active)   Pre Size Length 0.6 07/23/24 0800   Pre Size Width 0.5 07/23/24 0800   Pre Size Depth 0.1 07/23/24 0800   Pre Total Sq cm 0.3 07/23/24 0800       VASC  Wound Rt medial leg (Active)   Pre Size Length 0 24 1500   Pre Size Width 0 24 1500   Pre Size Depth 0 24 1500   Pre Total Sq cm 0 24 1500   Post Size Length 1 24 0900   Post Size Width 1 24 0900   Post Size Depth 0.2 24 0900   Post Total Sq cm 1 24 0900       Undermining is not present.    The periwoundskin is  N/A      Plan:         1. Patient will return in 1 week for nurse visit.           2. As listed below, treatment provided included application of multi-layer compression therapy.             Cleansed with: Dilute hibiclens 30cc in 500cc NS and soap and water    Protected skin with: Remedy skin repair lotion    Dressings Applied to wound:  N/A    Compression Applied to the right le-Layer Compression  Compression Applied to the left leg: Compression stockings    2-Layer [place]: I Applied the inner foam layer with the foot dorsiflexed and started atthe base of the fifth metatarsal head. I left the bottom of the heel exposed, and proceed by winding the foam up the leg using minimal overlap to just below the fibular head. I then applied the compression layer with the foot dorsiflexed and startingat the base of the fifth metatarsal head. I applied at full stretch and proceeded up the leg using 50% overlap. The bottom of the heel is covered with the compression layer up to the end at the fibular head just below the back of the knee and levelwith the top edge of the foam layer.  I gently pressed and conformed the entire surface of the system to ensurethat the two layers are firmly bound together    Offloading used: None    Trial Products: No    Provider notified regarding concerns: No    Treatment Changes: No    Tolerated Dressing Change:  Yes    Taught Regarding: follow up appointment(s), compression, layered compression wraps - maximum wear time of 7 days, elevation, offloading, and compliance    Educational Barriers: No barriers      MINNA HOUSE RN

## 2024-08-09 ENCOUNTER — OFFICE VISIT (OUTPATIENT)
Dept: CARDIOLOGY | Facility: CLINIC | Age: 64
End: 2024-08-09
Attending: NURSE PRACTITIONER
Payer: COMMERCIAL

## 2024-08-09 ENCOUNTER — OFFICE VISIT (OUTPATIENT)
Dept: VASCULAR SURGERY | Facility: CLINIC | Age: 64
End: 2024-08-09
Attending: NURSE PRACTITIONER
Payer: COMMERCIAL

## 2024-08-09 VITALS
SYSTOLIC BLOOD PRESSURE: 146 MMHG | DIASTOLIC BLOOD PRESSURE: 90 MMHG | BODY MASS INDEX: 31.21 KG/M2 | HEART RATE: 68 BPM | WEIGHT: 251 LBS | RESPIRATION RATE: 18 BRPM | HEIGHT: 75 IN

## 2024-08-09 VITALS
HEART RATE: 61 BPM | DIASTOLIC BLOOD PRESSURE: 90 MMHG | RESPIRATION RATE: 12 BRPM | TEMPERATURE: 98 F | SYSTOLIC BLOOD PRESSURE: 176 MMHG

## 2024-08-09 DIAGNOSIS — I10 PRIMARY HYPERTENSION: ICD-10-CM

## 2024-08-09 DIAGNOSIS — I71.21 ANEURYSM OF ASCENDING AORTA WITHOUT RUPTURE (H): Primary | ICD-10-CM

## 2024-08-09 DIAGNOSIS — I89.0 SECONDARY LYMPHEDEMA: ICD-10-CM

## 2024-08-09 DIAGNOSIS — I83.018 VENOUS STASIS ULCER OF OTHER PART OF RIGHT LOWER LEG WITH FAT LAYER EXPOSED WITH VARICOSE VEINS (H): Primary | ICD-10-CM

## 2024-08-09 DIAGNOSIS — I87.2 VENOUS INSUFFICIENCY: ICD-10-CM

## 2024-08-09 DIAGNOSIS — R01.1 HEART MURMUR: ICD-10-CM

## 2024-08-09 DIAGNOSIS — L97.812 VENOUS STASIS ULCER OF OTHER PART OF RIGHT LOWER LEG WITH FAT LAYER EXPOSED WITH VARICOSE VEINS (H): Primary | ICD-10-CM

## 2024-08-09 PROCEDURE — 99204 OFFICE O/P NEW MOD 45 MIN: CPT | Performed by: STUDENT IN AN ORGANIZED HEALTH CARE EDUCATION/TRAINING PROGRAM

## 2024-08-09 PROCEDURE — 29581 APPL MULTLAYER CMPRN SYS LEG: CPT

## 2024-08-09 PROCEDURE — G2211 COMPLEX E/M VISIT ADD ON: HCPCS | Performed by: STUDENT IN AN ORGANIZED HEALTH CARE EDUCATION/TRAINING PROGRAM

## 2024-08-09 RX ORDER — AMLODIPINE BESYLATE 5 MG/1
5 TABLET ORAL DAILY
Qty: 90 TABLET | Refills: 3 | Status: SHIPPED | OUTPATIENT
Start: 2024-08-09 | End: 2024-09-03

## 2024-08-09 ASSESSMENT — PAIN SCALES - GENERAL: PAINLEVEL: NO PAIN (0)

## 2024-08-09 NOTE — PROGRESS NOTES
[facility]   [address]  [place] [address]   [email or web address]   OFFICE: [phone]     CARDIOLOGY CLINIC NOTE     Thank you, Silvia Tomlin, for asking the [facility] team to see Mr. True Wakefield to evaluate New Patient        History of Present Illness   Mr. True Wakefield is a 64 year old male with a significant past history of venous insufficiency, HTN, former smoker, ascending aorta aneurysm, who presents for evaluation.  The patient notes he developed leg swelling a few months ago.  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  He has had intermittent swelling for a while but it worsened a few months ago and he went to the ED for evaluation.  There he had a CT scan which showed ascending aorta dilation.  He has no chest symptoms.  He like to do outdoor photography.  He denies any family hx of aneurysms.           Medications  Allergies   Current Outpatient Medications   Medication Sig Dispense Refill    aspirin (ASA) 325 MG EC tablet Take 650 mg by mouth every 4 hours as needed for moderate pain      cetirizine (ZYRTEC) 10 MG tablet Take 10 mg by mouth daily      clindamycin (CLEOCIN) 300 MG capsule Take 1 capsule (300 mg) by mouth 3 times daily (Patient not taking: Reported on 7/23/2024) 30 capsule 0    naproxen sodium 220 MG capsule Take 220 mg by mouth 2 times daily (with meals)      Omeprazole 20 MG TBDD Take 20 mg by mouth daily      ondansetron (ZOFRAN ODT) 4 MG ODT tab Take 1 tablet (4 mg) by mouth every 8 hours as needed for nausea (Patient not taking: Reported on 7/23/2024) 12 tablet 0    sodium chloride (OCEAN) 0.65 % nasal spray Spray 1 spray into both nostrils daily as needed for congestion      sulfamethoxazole-trimethoprim (BACTRIM DS) 800-160 MG tablet Take 1 tablet by mouth 2 times daily (Patient not taking: Reported on 7/23/2024) 20 tablet 0    triamcinolone (KENALOG) 0.1 % external cream Apply  "topically twice a week 45 g 2      Allergies   Allergen Reactions    Amoxicillin-Pot Clavulanate Anaphylaxis and Swelling    Amoxicillin Swelling    Clavulanic Acid Swelling        Physical Examination Review of Systems   Vitals: Ht 1.905 m (6' 3\")   Wt 113.9 kg (251 lb)   BMI 31.37 kg/m    BMI= Body mass index is 31.37 kg/m .  Wt Readings from Last 3 Encounters:   08/09/24 113.9 kg (251 lb)   07/23/24 118.1 kg (260 lb 6.4 oz)   07/03/24 118.4 kg (261 lb)       General: pleasant male. No acute distress.   Neck: No JVD  Lungs: clear to auscultation  COR:  regular rate and rhythm, II/VI systolic murmur, no rubs, or gallops  Extrem: No edema        Please refer above for cardiac ROS details.       Past History     Family History:   Family History   Problem Relation Age of Onset    Lung Cancer Mother     Prostate Cancer Father     Emphysema Father         Social History:   Social History     Socioeconomic History    Marital status: Single     Spouse name: Not on file    Number of children: Not on file    Years of education: Not on file    Highest education level: Not on file   Occupational History    Not on file   Tobacco Use    Smoking status: Former     Types: Cigarettes    Smokeless tobacco: Never   Vaping Use    Vaping status: Never Used   Substance and Sexual Activity    Alcohol use: Yes     Comment: occ    Drug use: Never    Sexual activity: Yes     Partners: Female   Other Topics Concern    Not on file   Social History Narrative    Not on file     Social Determinants of Health     Financial Resource Strain: Low Risk  (11/23/2023)    Financial Resource Strain     Within the past 12 months, have you or your family members you live with been unable to get utilities (heat, electricity) when it was really needed?: No   Food Insecurity: Low Risk  (11/23/2023)    Food Insecurity     Within the past 12 months, did you worry that your food would run out before you got money to buy more?: No     Within the past 12 " months, did the food you bought just not last and you didn t have money to get more?: No   Transportation Needs: Low Risk  (11/23/2023)    Transportation Needs     Within the past 12 months, has lack of transportation kept you from medical appointments, getting your medicines, non-medical meetings or appointments, work, or from getting things that you need?: No   Physical Activity: Not on file   Stress: Not on file   Social Connections: Not on file   Interpersonal Safety: Low Risk  (11/24/2023)    Interpersonal Safety     Do you feel physically and emotionally safe where you currently live?: Yes     Within the past 12 months, have you been hit, slapped, kicked or otherwise physically hurt by someone?: No     Within the past 12 months, have you been humiliated or emotionally abused in other ways by your partner or ex-partner?: No   Housing Stability: Low Risk  (11/23/2023)    Housing Stability     Do you have housing? : Yes     Are you worried about losing your housing?: No            Lab Results    Chemistry/lipid CBC Cardiac Enzymes/BNP/TSH/INR   Lab Results   Component Value Date    BUN 14.8 07/03/2024     07/03/2024    CO2 27 07/03/2024    Lab Results   Component Value Date    WBC 4.8 07/03/2024    HGB 13.2 (L) 07/03/2024    HCT 40.4 07/03/2024    MCV 90 07/03/2024     07/03/2024    Lab Results   Component Value Date    TSH 1.31 07/03/2024          Cardiac Problems and Cardiac Diagnostics     Most Recent Cardiac testing:  ECG Personal interpretation  11/5/2023  NSR  Possible anterior infarct    CTA chest 7/3/2024  The ascending thoracic aorta is 4.5 x 4.6 cm. Origins of the great arteries are patent. The distal aortic arch measures up to 3.9 cm. Descending thoracic aorta is normal in size and appearance. Origins of the celiac axis, SMA, bilateral renal arteries   are patent. Accessory left renal artery incidentally identified. Abdominal aorta is normal in size. Accessory right renal artery also noted  with origin near the level of the WALESKA, which is patent. Both common iliac, internal iliac, external iliac, common   femoral, profunda femoral, superficial femoral, and popliteal arteries are patent. Runoff arteries in the right lower extremity seen patent, though given large field of view with imaging, there is venous contamination, limiting assessment of right lower   extremity runoff arteries. There is significant swelling in the right lower leg. Multiple dilated superficial veins throughout the subcutaneous tissue in the right lower leg raising suspicion of venous incompetence. Runoff arteries in the left lower   extremity are patent. The aortic root is unable to be measured given motion artifact.         Assessment/Recommendations   Assessment:    Mr. True Wakefield is a 64 year old male with a significant past history of venous insufficiency, HTN, former smoker, ascending aorta aneurysm, who presents for evaluation.      Ascending aorta aneurysm   - Measures 4.6 cm on CT 7/2024.  Discussed that threshold for repair/replace would be 5.5 cm or with rapid expansion   - Will screen for bicuspid AV with an echocardiogram   - Plan for repeat TTE 1 year after that if aneurysm is well seen.  If not then plan on CTA.   - Discussed importance of tight BP control   - Hold off on BB given HR in the 60s.      HTN   - BP elevated   - Start amlodipine 5mg daily   - Turn in BP log in 3 weeks    Heart murmur    - Likely aortic sclerosis   - TTE as above    RTC 6 months         Nate Frey DO [facility]  Non-invasive Cardiologist  [facility]

## 2024-08-09 NOTE — LETTER
8/9/2024    Silvia Potts DO  [address]  [facility] [address]    RE: True MILLER Ashu       Dear Colleague,     I had the pleasure of seeing True Wakefield in the [facility].    [facility]   [address]  [place] [address]   [email or web address]   OFFICE: [phone]     CARDIOLOGY CLINIC NOTE     Thank you, Silvia Tomlin, for asking the [facility] team to see . True Wakefield to evaluate New Patient        History of Present Illness   Mr. True Wakefield is a 64 year old male with a significant past history of venous insufficiency, HTN, former smoker, ascending aorta aneurysm, who presents for evaluation.  The patient notes he developed leg swelling a few months ago.  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  He has had intermittent swelling for a while but it worsened a few months ago and he went to the ED for evaluation.  There he had a CT scan which showed ascending aorta dilation.  He has no chest symptoms.  He like to do outdoor photography.  He denies any family hx of aneurysms.           Medications  Allergies   Current Outpatient Medications   Medication Sig Dispense Refill     aspirin (ASA) 325 MG EC tablet Take 650 mg by mouth every 4 hours as needed for moderate pain       cetirizine (ZYRTEC) 10 MG tablet Take 10 mg by mouth daily       clindamycin (CLEOCIN) 300 MG capsule Take 1 capsule (300 mg) by mouth 3 times daily (Patient not taking: Reported on 7/23/2024) 30 capsule 0     naproxen sodium 220 MG capsule Take 220 mg by mouth 2 times daily (with meals)       Omeprazole 20 MG TBDD Take 20 mg by mouth daily       ondansetron (ZOFRAN ODT) 4 MG ODT tab Take 1 tablet (4 mg) by mouth every 8 hours as needed for nausea (Patient not taking: Reported on 7/23/2024) 12 tablet 0     sodium chloride (OCEAN) 0.65 % nasal spray Spray 1 spray into both nostrils daily as needed for congestion  "      sulfamethoxazole-trimethoprim (BACTRIM DS) 800-160 MG tablet Take 1 tablet by mouth 2 times daily (Patient not taking: Reported on 7/23/2024) 20 tablet 0     triamcinolone (KENALOG) 0.1 % external cream Apply topically twice a week 45 g 2      Allergies   Allergen Reactions     Amoxicillin-Pot Clavulanate Anaphylaxis and Swelling     Amoxicillin Swelling     Clavulanic Acid Swelling        Physical Examination Review of Systems   Vitals: Ht 1.905 m (6' 3\")   Wt 113.9 kg (251 lb)   BMI 31.37 kg/m    BMI= Body mass index is 31.37 kg/m .  Wt Readings from Last 3 Encounters:   08/09/24 113.9 kg (251 lb)   07/23/24 118.1 kg (260 lb 6.4 oz)   07/03/24 118.4 kg (261 lb)       General: pleasant male. No acute distress.   Neck: No JVD  Lungs: clear to auscultation  COR:  regular rate and rhythm, II/VI systolic murmur, no rubs, or gallops  Extrem: No edema        Please refer above for cardiac ROS details.       Past History     Family History:   Family History   Problem Relation Age of Onset     Lung Cancer Mother      Prostate Cancer Father      Emphysema Father         Social History:   Social History     Socioeconomic History     Marital status: Single     Spouse name: Not on file     Number of children: Not on file     Years of education: Not on file     Highest education level: Not on file   Occupational History     Not on file   Tobacco Use     Smoking status: Former     Types: Cigarettes     Smokeless tobacco: Never   Vaping Use     Vaping status: Never Used   Substance and Sexual Activity     Alcohol use: Yes     Comment: occ     Drug use: Never     Sexual activity: Yes     Partners: Female   Other Topics Concern     Not on file   Social History Narrative     Not on file     Social Determinants of Health     Financial Resource Strain: Low Risk  (11/23/2023)    Financial Resource Strain      Within the past 12 months, have you or your family members you live with been unable to get utilities (heat, " electricity) when it was really needed?: No   Food Insecurity: Low Risk  (11/23/2023)    Food Insecurity      Within the past 12 months, did you worry that your food would run out before you got money to buy more?: No      Within the past 12 months, did the food you bought just not last and you didn t have money to get more?: No   Transportation Needs: Low Risk  (11/23/2023)    Transportation Needs      Within the past 12 months, has lack of transportation kept you from medical appointments, getting your medicines, non-medical meetings or appointments, work, or from getting things that you need?: No   Physical Activity: Not on file   Stress: Not on file   Social Connections: Not on file   Interpersonal Safety: Low Risk  (11/24/2023)    Interpersonal Safety      Do you feel physically and emotionally safe where you currently live?: Yes      Within the past 12 months, have you been hit, slapped, kicked or otherwise physically hurt by someone?: No      Within the past 12 months, have you been humiliated or emotionally abused in other ways by your partner or ex-partner?: No   Housing Stability: Low Risk  (11/23/2023)    Housing Stability      Do you have housing? : Yes      Are you worried about losing your housing?: No            Lab Results    Chemistry/lipid CBC Cardiac Enzymes/BNP/TSH/INR   Lab Results   Component Value Date    BUN 14.8 07/03/2024     07/03/2024    CO2 27 07/03/2024    Lab Results   Component Value Date    WBC 4.8 07/03/2024    HGB 13.2 (L) 07/03/2024    HCT 40.4 07/03/2024    MCV 90 07/03/2024     07/03/2024    Lab Results   Component Value Date    TSH 1.31 07/03/2024          Cardiac Problems and Cardiac Diagnostics     Most Recent Cardiac testing:  ECG Personal interpretation  11/5/2023  NSR  Possible anterior infarct    CTA chest 7/3/2024  The ascending thoracic aorta is 4.5 x 4.6 cm. Origins of the great arteries are patent. The distal aortic arch measures up to 3.9 cm. Descending  thoracic aorta is normal in size and appearance. Origins of the celiac axis, SMA, bilateral renal arteries   are patent. Accessory left renal artery incidentally identified. Abdominal aorta is normal in size. Accessory right renal artery also noted with origin near the level of the WALESKA, which is patent. Both common iliac, internal iliac, external iliac, common   femoral, profunda femoral, superficial femoral, and popliteal arteries are patent. Runoff arteries in the right lower extremity seen patent, though given large field of view with imaging, there is venous contamination, limiting assessment of right lower   extremity runoff arteries. There is significant swelling in the right lower leg. Multiple dilated superficial veins throughout the subcutaneous tissue in the right lower leg raising suspicion of venous incompetence. Runoff arteries in the left lower   extremity are patent. The aortic root is unable to be measured given motion artifact.         Assessment/Recommendations   Assessment:    Mr. True Wakefield is a 64 year old male with a significant past history of venous insufficiency, HTN, former smoker, ascending aorta aneurysm, who presents for evaluation.      Ascending aorta aneurysm   - Measures 4.6 cm on CT 7/2024.  Discussed that threshold for repair/replace would be 5.5 cm or with rapid expansion   - Will screen for bicuspid AV with an echocardiogram   - Plan for repeat TTE 1 year after that if aneurysm is well seen.  If not then plan on CTA.   - Discussed importance of tight BP control   - Hold off on BB given HR in the 60s.      HTN   - BP elevated   - Start amlodipine 5mg daily   - Turn in BP log in 3 weeks    Heart murmur    - Likely aortic sclerosis   - TTE as above    RTC 6 months         Nate Frey DO [facility]  Non-invasive Cardiologist  [facility]         Thank you for allowing me to participate in the care of your patient.      Sincerely,     Nate Frey DO     [facility]  [facility]  cc:   Mariama Matthews NP  [address]  [place] [address]

## 2024-08-09 NOTE — PATIENT INSTRUCTIONS
It was a pleasure meeting you today    In summary:    We will get an echocardiogram to evaluate the heart and valve function.  We will start you on amlodpine 5mg once a day for blood pressure.  Please keep a BP log and send it to me in 3 weeks.    Please call my nurse Dexter Aj at [phone] if you have any questions or issues.    We will schedule a follow up visit in  6 months      Nate Frey DO [facility]  Non-invasive Cardiologist  [facility]

## 2024-08-16 ENCOUNTER — OFFICE VISIT (OUTPATIENT)
Dept: VASCULAR SURGERY | Facility: CLINIC | Age: 64
End: 2024-08-16
Attending: NURSE PRACTITIONER
Payer: COMMERCIAL

## 2024-08-16 VITALS
TEMPERATURE: 97.9 F | RESPIRATION RATE: 12 BRPM | HEART RATE: 58 BPM | SYSTOLIC BLOOD PRESSURE: 137 MMHG | OXYGEN SATURATION: 97 % | DIASTOLIC BLOOD PRESSURE: 84 MMHG

## 2024-08-16 DIAGNOSIS — I89.0 SECONDARY LYMPHEDEMA: ICD-10-CM

## 2024-08-16 DIAGNOSIS — L97.812 VENOUS STASIS ULCER OF OTHER PART OF RIGHT LOWER LEG WITH FAT LAYER EXPOSED WITH VARICOSE VEINS (H): Primary | ICD-10-CM

## 2024-08-16 DIAGNOSIS — I87.2 VENOUS INSUFFICIENCY: ICD-10-CM

## 2024-08-16 DIAGNOSIS — I83.018 VENOUS STASIS ULCER OF OTHER PART OF RIGHT LOWER LEG WITH FAT LAYER EXPOSED WITH VARICOSE VEINS (H): Primary | ICD-10-CM

## 2024-08-16 PROCEDURE — 99214 OFFICE O/P EST MOD 30 MIN: CPT

## 2024-08-16 PROCEDURE — G0463 HOSPITAL OUTPT CLINIC VISIT: HCPCS

## 2024-08-16 ASSESSMENT — PAIN SCALES - GENERAL: PAINLEVEL: NO PAIN (0)

## 2024-08-16 NOTE — PATIENT INSTRUCTIONS
Wear your compression stockings every day; put them on first thing in the morning and remove at bedtime    Replace your compression stockings every 6 months; these garments will lose their elasticity and become ineffective    Hang your stockings, do not put them in the dryer.  This will help prolong the life of your compressions stockings.     Elevate your legs periodically throughout the day, 30-60 minutes 1-3 times per day    Do calf pumping exercises periodically throughout the day.      Audi Franco  [email or web address]  [phone]      Compression Stocking Tricks for Application and Removal    Purchase ALPS Prosthetic Fitting Lotion  Apply pea-sized amount to each leg prior to donning stockings  [email or web address]          Donning Devices                      Garden Gloves

## 2024-08-16 NOTE — PROGRESS NOTES
[facility]  -  Nurse Visit      RLE      R medial ankle    Date of Service:  August 16, 2024     Requesting Provider: LILLIAM Matthews    Diagnosis:     ICD-10-CM    1. Venous stasis ulcer of other part of right lower leg with fat layer exposed with varicose veins (H)  I83.018     L97.812       2. Secondary lymphedema  I89.0 Compression Sleeve/Stocking Order for DME - ONLY FOR DME      3. Venous insufficiency  I87.2 Compression Sleeve/Stocking Order for DME - ONLY FOR DME          Chief Complaint: True is being seen today at [facility] [place] for his wound(s) and swelling dressing change. Reports pain of 0 out of 10.  Denies any fevers, chills, or generalized ill feeling.     Dressing on Arrival: 2 layer right leg c/d/I. Pt is currently using regular 2 layer on the right leg and compression stocking on the left leg for compression and did tolerate well. Upon removal of dressings no drainage is noted. Wound appears to remain healed.    New Wounds noted: No    Vital Signs: /84   Pulse 58   Temp 97.9  F (36.6  C) (Oral)   Resp 12   SpO2 97%     Assessment:      General:  Patient presents to clinic in no apparent distress.   Psychiatric:  Alert and oriented x3.   Lower extremity:  edema is present.    Integumentary:  Skin is WNL    Circumferential volume measures:      7/23/2024     8:00 AM 7/26/2024     3:00 PM 8/9/2024     2:00 PM   Circumferential Measures   Right just above MTP 30 26.5 27.5   Right Ankle 30.3 30.5 25.8   Right Widest Calf 41.7 42 40.7   Left - just above MTP 27     Left Ankle 27     Left Widest Calf 40.3         Wound info:  VASC Wound Rt medial leg (Active)   Pre Size Length 0.6 07/23/24 0800   Pre Size Width 0.5 07/23/24 0800   Pre Size Depth 0.1 07/23/24 0800   Pre Total Sq cm 0.3 07/23/24 0800       VASC Wound Rt medial leg (Active)   Pre Size Length 0 08/16/24 1400   Pre Size Width 0 08/16/24 1400   Pre Size Depth 0 08/16/24 1400   Pre Total  Sq cm 0 08/16/24 1400   Post Size Length 1 07/23/24 0900   Post Size Width 1 07/23/24 0900   Post Size Depth 0.2 07/23/24 0900   Post Total Sq cm 1 07/23/24 0900       Undermining is not present.    The periwoundskin is  N/A      Plan:         1. Patient will return 10/8/24 for provider visit           2. As listed below, treatment provided transitioned into compression stocking.             Cleansed with: soap and water    Protected skin with: Remedy skin repair lotion    Dressings Applied to wound:  N/A    Compression Applied to the right leg: Compression stockings  Compression Applied to the left leg: Compression stockings    Offloading used: None    Trial Products: No    Provider notified regarding concerns: No    Treatment Changes: No    Tolerated Dressing Change:  Yes    Taught Regarding: follow up appointment(s), compression, elevation, and offloading    Educational Barriers: No barriers      MINNA HOUSE RN

## 2024-08-23 ENCOUNTER — HOSPITAL ENCOUNTER (OUTPATIENT)
Dept: CARDIOLOGY | Facility: HOSPITAL | Age: 64
Discharge: HOME | End: 2024-08-23
Attending: STUDENT IN AN ORGANIZED HEALTH CARE EDUCATION/TRAINING PROGRAM | Admitting: STUDENT IN AN ORGANIZED HEALTH CARE EDUCATION/TRAINING PROGRAM
Payer: COMMERCIAL

## 2024-08-23 DIAGNOSIS — I71.21 ANEURYSM OF ASCENDING AORTA WITHOUT RUPTURE (H): ICD-10-CM

## 2024-08-23 LAB — LVEF ECHO: NORMAL

## 2024-08-23 PROCEDURE — 93306 TTE W/DOPPLER COMPLETE: CPT | Mod: 26 | Performed by: STUDENT IN AN ORGANIZED HEALTH CARE EDUCATION/TRAINING PROGRAM

## 2024-08-23 PROCEDURE — 93306 TTE W/DOPPLER COMPLETE: CPT

## 2024-08-26 DIAGNOSIS — I71.21 ANEURYSM OF ASCENDING AORTA WITHOUT RUPTURE (H): Primary | ICD-10-CM

## 2024-08-27 DIAGNOSIS — I10 PRIMARY HYPERTENSION: ICD-10-CM

## 2024-08-27 DIAGNOSIS — I71.21 ANEURYSM OF ASCENDING AORTA WITHOUT RUPTURE (H): Primary | ICD-10-CM

## 2024-08-30 ENCOUNTER — TELEPHONE (OUTPATIENT)
Dept: CARDIOLOGY | Facility: CLINIC | Age: 64
End: 2024-08-30
Payer: COMMERCIAL

## 2024-08-30 DIAGNOSIS — I10 PRIMARY HYPERTENSION: ICD-10-CM

## 2024-08-30 DIAGNOSIS — I71.21 ANEURYSM OF ASCENDING AORTA WITHOUT RUPTURE (H): ICD-10-CM

## 2024-08-30 NOTE — TELEPHONE ENCOUNTER
Dr. Frey, please review BP readings. Any recs at this time? CMM,Rn   _________________________________________________________    Incoming one page BP log dropped off at clinic. Will route to Dr. Frey for review. Snehal CALERO

## 2024-09-03 RX ORDER — AMLODIPINE BESYLATE 10 MG/1
10 TABLET ORAL DAILY
Qty: 30 TABLET | Refills: 3 | Status: SHIPPED | OUTPATIENT
Start: 2024-09-03

## 2024-09-03 NOTE — TELEPHONE ENCOUNTER
===View-only below this line===  ----- Message -----  From: Nate Frey DO  Sent: 9/3/2024   1:23 PM CDT  To: Dexter Aj RN    Let's go ahead and increase amlodipine to 10mg.  Another BP log in 3 weeks.

## 2024-09-03 NOTE — TELEPHONE ENCOUNTER
PC with patient. Review of medication will increase the medication starting tomorrow. New Rx sent to verified pharmacy. He will resume checking his BP after about one week on the higher dose, and then report after approximately a total of 2 weeks of readings to share. Monitor for bilateral equal pedal/ankle edema. Call if concerning. Verbalized understanding. GALILEA,RN

## 2024-10-01 NOTE — PATIENT INSTRUCTIONS
True    Thank you for entrusting your care with us at the [facility].      We are prescribing some compression stockings for you. I have included different suppliers that should help you get measured and fitting to ensure proper fitting socks. You should wear these stockings as much as you can. It is especially important to wear them with long periods of standing, sitting, long car rides or if you will be flying. Compression socks should get refilled every 4-6 months. They do not need to be worn at night while in bed. It is recommended to wear compression level of 20-30mmhg or higher from toes to knees.              Varicose Veins    Varicose veins are twisted, enlarged veins near the surface of the skin. They develop most often in the legs and ankles.    Some people may be more likely than others to get varicose veins because of several things. These include aging, pregnancy, being overweight, or because a parent has them. Standing or sitting for long periods of time can also increase risk of varicose veins.    Follow-up care is a key part of your treatment and safety. Be sure to make and go to all appointments, and call your doctor if you are having problems. It's also a good idea to know your test results and keep a list of the medicines you take.      Varicose veins are caused by weakened valves and veins in your legs. Normally, one-way valves in your veins keep blood flowing from your legs up toward your heart. When these valves don't work as they should, blood collects in your legs, and pressure builds up. The veins become weak, large, and twisted.    How can you care for yourself at home?  Wear compression stockings during the day to help relieve symptoms and improve blood flow. Talk to your doctor about which ones to get and where to get them.  Prop up your legs at or above the level of your heart when possible. Try to do this for about 30 minutes at a time, about 3 times a day. This  helps keep the blood from pooling in your lower legs and improves blood flow to the rest of your body.  Avoid sitting and standing for long periods. This puts added stress on your veins.  Stay at a healthy weight. Lose weight if you need to.  Try to take several short walks every day.  Get at least 30 minutes of exercise on most days of the week. Walking is a good choice. You also may want to do other activities, such as running, swimming, cycling, or playing tennis or team sports.  Do calf muscle exercises every day. When you are sitting down, rotate your feet at the ankles in both directions, making small circles. Extend your legs, and point and flex your feet.  Avoid crossing your legs at the knees when sitting.  Take good care of your skin. Treat cuts and scrapes on your legs right away. Keep your legs clean and moisturized to prevent drying and cracking. Prevent sunburns.  Do not smoke. Smoking can make varicose veins worse. If you need help quitting, talk to your doctor about stop-smoking programs and medicines. These can increase your chances of quitting for good.  If you bump your leg so hard that you know it is likely to bruise, prop up your leg and apply ice or cold packs right away. Apply the ice or cold pack for 10 to 20 minutes, 3 or more times a day. Put a thin cloth between the ice and your skin.  If you cut or scratch the skin over a vein, it may bleed a lot. Prop up your leg and apply firm pressure for a full 15 minutes.  If you have a blood clot in a varicose vein, you may have tenderness and swelling over the vein. The vein may feel firm. Be sure to call your doctor right away if you have these symptoms. If your doctor has told you how to care for the clot, follow the instructions.     Care may include the following:    Prop up your leg and apply a damp cloth that is warm or cool.  Ask your doctor if you can take an over-the-counter pain medicine, such as acetaminophen (Tylenol), ibuprofen (Advil,  Motrin), or naproxen (Aleve). Be safe with medicines. Read and follow all instructions on the label.    When should you call for help?     Call 911 anytime you think you may need emergency care. For example, call if:    You have sudden chest pain and shortness of breath, or you cough up blood.    Call your doctor now or seek immediate medical care if:    You have signs of a blood clot in your leg (called a deep vein thrombosis), such as:  Pain in your calf, back of the knee, thigh, or groin.  Swelling in the leg or groin.  A color change on the leg or groin. The skin may be reddish or purplish, depending on your usual skin color.  A varicose vein begins to bleed and you cannot stop it.  You have a tender lump in your leg.  You get an open sore.    Watch closely for changes in your health, and be sure to contact your doctor if:    Your varicose vein symptoms do not improve with home treatment.    Current as of: December 19, 2022  Author: [employer]wise Staff  Medical Review:Richard Yañez MD - Family Medicine & BASSAM Tripathi MD - Internal Medicine & Lloyd Diaz MD - Family Medicine & Garrett Chavez MD - Family Medicine & Adam Duke MD - Diagnostic Radiology    Please bring your compression prescription to a home medical supply store. Here is a list of locations but not limited to.     [facility]  [address]  [address] [place] [address]  Phone: [phone]  Fax: [phone] [facility] Bld[facility].  [address] [place] [address]  Phone: [phone]  Fax: [phone]   [facility]  [address] [place] [address]  Phone: [phone]  Fax: [phone] [facility]  [address]  [address] [place] [address]  Phone: [phone]  Fax: [phone]   [facility]  [place]  [address] [place] [address]  Phone: [phone] [facility]   [address] ([facility] [place] [address]  Phone: [phone]   [facility]  [address] [place] [address]      Phone: [phone]  Fax: [phone] [place]  [address] [address]      Phone: [phone]  Fax: [phone]     [facility] [email or web address]  [address], [place] [address]  [phone]    [facility]  [email or web address]  [address] [place] [address]  Phone: [phone]     [address] [place] [address]  Phone: [phone]    [address] [place] [address]  Phone: [phone]    [address], [place] [address]  Phone: [phone] [address], [place] [address]  Phone: [phone]    [facility] [email or web address]  [address] [place] [address]  Phone: [phone]    [address], [place] [address]  Phone: [phone]    [address], [place] [address]  Phone: [phone]    Audi [employer]  [email or web address]  [phone]   Wear your compression stockings every day; put them on first thing in the morning and remove at bedtime    Replace your compression stockings every 6 months; these garments will lose their elasticity and become ineffective    Hang your stockings, do not put them in the dryer.  This will help prolong the life of your compressions stockings.     Elevate your legs periodically throughout the day, 30-60 minutes 1-3 times per day    Do calf pumping exercises periodically throughout the day.      [employer]  [email or web address]  [phone]      Compression Stocking Tricks for Application and Removal    Purchase ALPS Prosthetic Fitting Lotion  Apply pea-sized amount to each leg prior to donning  stockings  [email or web address]          Donning Devices                      Garden Gloves         Pre-Procedure Instructions for Varicose Vein Ablation   We look forward to scheduling a varicose vein procedure for you. First, we will submit a prior authorization to your insurance company if required. This typically takes 10-14 days. We will contact you once we have gotten the approval to schedule the procedure.  The following is helpful information for you regarding your treatment:  **Important: A  will be needed post procedure.  (Unable to use Taxi or Uber).  Please allow 1-2 hours for your vein procedure appointment.  Take your routine medications as you normally would except for blood thinners. Aspirin is ok to continue.  If you take Warfarin, Xarelto, or Eliquis this will need to be HELD prior to procedure according to primary care provider or cardiology who prescribes this medication. Please notify us if you take this medication.  We have thigh high compression stocking sizes medium to X-large that will be applied immediately after the procedure. You will need to provide your own if one of these sizes will not fit. Another option is to bring in knee high compression and biker compression shorts.   Please wear comfortable clothing.  We recommend that you bring a change of undergarment in case it gets stained by the cleansing solution.  Feel free to bring a personal music player or a CD to listen to during your procedure.  It is advised not to fly within 3 weeks after the procedure.  You do not have to fast prior to this procedure.  For any questions regarding your procedure please call [phone] to speak with the nurse.  If you would like a Good Maddie Estimate for your upcoming procedure contact Cost of Care Estimates at [phone], advocates are available Monday through Friday 8am - 4:30pm.    Radiofrequency Ablation Codes:   - 41633 for first vein in either  legx 2  Varicose veins may be a sign of something more severe - venous reflux disease.  Healthy leg veins have valves that keep blood flowing to the heart. Venous reflux disease develops when the valves stop working properly and allow blood to flow backward (i.e., reflux) and pool in the lower leg veins.   If venous reflux disease is left untreated, symptoms can worsen over time. Your doctor can help you understand if you have this condition.     Superficial venous reflux disease may cause the following signs and symptoms in your legs:  Varicose veins  Aching  Swelling  Cramping  Heaviness or tiredness  Itching  Restlessness  Open skin sores    Superficial venous reflux disease treatment aims to reduce or stop the backward flow of blood. The following may be prescribed to treat your superficial venous reflux disease. Your doctor can help you decide which treatment is best for you:   Compression stockings   Removing diseased vein   Closing diseased vein (through thermal or non-thermal treatment)     Radiofrequency Ablation (RFA) Treatment for Varicose Veins  Radiofrequency ablation (RFA) is a thermal procedure to treat varicose veins. It uses heat created from radiofrequency (RF). During RFA treatment, RF heat is sent into your vein through a thin, flexible tube (catheter). This closes off blood flow in the main problem vein.           Getting ready for your treatment  Tell your provider if you:  Are pregnant or think you may be pregnant  Are breastfeeding  Smoke, use alcohol or street drugs on a regular basis  Have any allergies or intolerances to certain medicines. Explain what reaction you have had to these medicines in the past.      The day of your treatment  The treatment takes 45 to 60 minutes. The entire treatment (including time to prepare and recover) takes about 1 to 3 hours. You can go home the same day. For the treatment:   You'll lie down on a hospital bed.  An imaging method, such as ultrasound, is  used to guide the procedure.  The leg to be treated is injected with numbing medicine.  Once your leg is numb, a needle makes a small hole (puncture) in the vein to be treated.  The catheter with the RF heat source is inserted into your vein.  More numbing medicine may be injected around your vein.  Once the catheter is in the right position, it is then slowly drawn backward. As the catheter sends out heat, the vein is closed off.  In some cases, other side branch varicose veins may be removed or tied off through a few small cuts (incisions).  When the treatment is done, the catheter is removed. Pressure is applied to the insertion site to stop any bleeding. An elastic compression stocking or a bandage may then be put on your leg.       Recovering at home  Once at home, follow all the instructions you've been given. Be sure to:  Check for signs of infection at the catheter insertion sites (see below)  Wear thigh high compressions as directed  Keep your legs raised (elevated) as directed  Walk a few times a day  Avoid heavy exercise, lifting, and standing for long periods as advised. No lifting >20lbs for 2 weeks.   Avoid air travel, hot baths, saunas, or whirlpools as advised  Do not drive or operate heavy machinery for 24 hours after the procedure  Leakage from the numbing medications the first few hours is normal.          Call your healthcare provider if you have any of the following:  Fever of 100.4 F (38 C) or higher, or as directed by your provider  Chest pain or trouble breathing  Signs of infection at the catheter insertion site. These include increased redness or swelling (inflammation), warmth, increasing pain, bleeding, or bad-smelling discharge.  Severe numbness or tingling in the treated leg  Severe pain or swelling in the treated leg      Follow-up  You'll have an ultrasound within the same week as the procedure to check for problems, such as blood clots. You will follow up with the provider after 6  weeks.   Risks and possible complications   These include the following:  Bleeding, Infection, Blood clots, Damage to the nerves in the treated area, Irritation or burning of the skin over the treated vein. Treatment doesn't improve the look or the symptoms of the problem veins  Risks of any medicines used during the treatment

## 2024-10-08 ENCOUNTER — OFFICE VISIT (OUTPATIENT)
Dept: VASCULAR SURGERY | Facility: CLINIC | Age: 64
End: 2024-10-08
Attending: SPECIALIST
Payer: COMMERCIAL

## 2024-10-08 VITALS
HEART RATE: 66 BPM | TEMPERATURE: 98.1 F | SYSTOLIC BLOOD PRESSURE: 160 MMHG | DIASTOLIC BLOOD PRESSURE: 89 MMHG | OXYGEN SATURATION: 99 %

## 2024-10-08 DIAGNOSIS — L97.812 VENOUS STASIS ULCER OF OTHER PART OF RIGHT LOWER LEG WITH FAT LAYER EXPOSED WITH VARICOSE VEINS (H): ICD-10-CM

## 2024-10-08 DIAGNOSIS — I87.2 VENOUS INSUFFICIENCY: ICD-10-CM

## 2024-10-08 DIAGNOSIS — I83.893 SYMPTOMATIC VARICOSE VEINS OF BOTH LOWER EXTREMITIES: Primary | ICD-10-CM

## 2024-10-08 DIAGNOSIS — I89.0 SECONDARY LYMPHEDEMA: ICD-10-CM

## 2024-10-08 DIAGNOSIS — I83.018 VENOUS STASIS ULCER OF OTHER PART OF RIGHT LOWER LEG WITH FAT LAYER EXPOSED WITH VARICOSE VEINS (H): ICD-10-CM

## 2024-10-08 PROCEDURE — 99214 OFFICE O/P EST MOD 30 MIN: CPT | Performed by: SPECIALIST

## 2024-10-08 ASSESSMENT — PAIN SCALES - GENERAL: PAINLEVEL: MILD PAIN (2)

## 2024-10-08 NOTE — PROGRESS NOTES
Vascular Clinic Rooming Questions        Patient is here for a consult to discuss Varicose Veins. The patient does  wear compressions. The patient has varicose veins that are problematic in right legs. Patient states their varicose veins are bothersome when standing, sitting, working, and household chores.     US reviewed and bilateral GSV reflux and deep reflux. Patient wound remains healed. Symptomatic VV remain following > 3 months conservative treatment of compression socks, elevation and exercise and weight loss.Will preauth bilateral GSV/perforators RFA [facility].Will need work FMLA filled out.    BP (!) 160/89   Pulse 66   Temp 98.1  F (36.7  C)   SpO2 99%     The provider has been notified that the patient has concerns of right leg and foot swelling.     Questions patient would like addressed today are: N/A.    Refills are needed: N/A    Has homecare services and agency name:  No

## 2024-10-08 NOTE — PROGRESS NOTES
[facility] Vein Consult      Assessment:     1. varicose veins and spider veins, bilateral   2.  Bilateral leg swelling and venous stasis ulcers, bilateral   3. Insuffiencey of bilateral  greater saphenous vein,   bilateral incompetent perforators    Plan:     1. Treatment options of conservative therapy of stockings use, exercise, weight loss, elevating legs when possible.    2. Script for compression stockings 20-30 mm hg  3. Ultrasound to evaluate legs for incompetency of both deep and superficial system .   4. Surgical treatment   Endovenous closure of bilateral, greater saphenous vein, bilateral incompetent perforators    Risks and benefits of surgical intervention including infection, burns, dvt, thrombophlebitis, not closing, recurrence, numbness and nerve injury and need for further intervention were all discused    5. Follow up:  for procedure if approved.   .   6. Call for any questions concerns or issues    Subjective:      True Wakefield is a 64 year old male  who was referred by Silvia Potts  for evaluation of varicose veins. Symptoms include pain, aching, fatigue, burning, edema, dermatitis, external bleeding, and chronic ulcers. Patient has history of leg selling, pain and vein issues that have progressed. Pain and symptoms have affected daily living and work activities needing medications. Here for evaluation today. Stocking use with compression stockings of 20-30 mm hg or greater for greater then 3 months    Allergies:Amoxicillin-pot clavulanate, Amoxicillin, and Clavulanic acid    Past Medical History:   Diagnosis Date    Overweight 08/29/2022       History reviewed. No pertinent surgical history.      Current Outpatient Medications:     amLODIPine (NORVASC) 10 MG tablet, Take 1 tablet (10 mg) by mouth daily., Disp: 30 tablet, Rfl: 3    Omeprazole 20 MG TBDD, Take 20 mg by mouth daily, Disp: , Rfl:     sodium chloride (OCEAN) 0.65 % nasal spray, Spray 1 spray into both nostrils  daily as needed for congestion, Disp: , Rfl:     aspirin (ASA) 325 MG EC tablet, Take 650 mg by mouth every 4 hours as needed for moderate pain (Patient not taking: Reported on 8/9/2024), Disp: , Rfl:     cetirizine (ZYRTEC) 10 MG tablet, Take 10 mg by mouth daily (Patient not taking: Reported on 10/8/2024), Disp: , Rfl:     clindamycin (CLEOCIN) 300 MG capsule, Take 1 capsule (300 mg) by mouth 3 times daily (Patient not taking: Reported on 7/23/2024), Disp: 30 capsule, Rfl: 0    naproxen sodium 220 MG capsule, Take 220 mg by mouth 2 times daily (with meals) (Patient not taking: Reported on 8/9/2024), Disp: , Rfl:     ondansetron (ZOFRAN ODT) 4 MG ODT tab, Take 1 tablet (4 mg) by mouth every 8 hours as needed for nausea (Patient not taking: Reported on 7/23/2024), Disp: 12 tablet, Rfl: 0    sulfamethoxazole-trimethoprim (BACTRIM DS) 800-160 MG tablet, Take 1 tablet by mouth 2 times daily (Patient not taking: Reported on 7/23/2024), Disp: 20 tablet, Rfl: 0    triamcinolone (KENALOG) 0.1 % external cream, Apply topically twice a week (Patient not taking: Reported on 8/9/2024), Disp: 45 g, Rfl: 2    Current Facility-Administered Medications:     lidocaine (XYLOCAINE) 5 % ointment, , Topical, Daily PRN, Mariama Matthews, NP, Given at 07/23/24 0827     Family History   Problem Relation Age of Onset    Lung Cancer Mother     Prostate Cancer Father     Emphysema Father         reports that he has quit smoking. His smoking use included cigarettes. He has never used smokeless tobacco. He reports current alcohol use. He reports that he does not use drugs.      Review of Systems:    Pertinent items are noted in HPI.  Patient has symptomatic veins and changes of bilateral legs. These have progressed to the point of causing symptoms on a daily basis. This causes issues with daily activities and chores such as mowing lawn, outdoor upkeep, and standing for long lengths of time       Objective:     Vitals:    10/08/24 0933   BP: (!)  160/89   Pulse: 66   Temp: 98.1  F (36.7  C)   SpO2: 99%     There is no height or weight on file to calculate BMI.    EXAM:  GENERAL: This is a well-developed 64 year old male who appears his stated age  HEAD: normocephalic  HEENT: Pupils equal and reactive bilaterally  MOUTH: mucus membranes intact. Normal dentation  CARDIAC: RRR without murmur  CHEST/LUNG:  Clear to auscultation bilaterally  ABDOMEN: Soft, nontender, nondistended, no masses noted   NEUROLOGIC: Focally intact, nonfocal, alert and oriented x 3  INTEGUMENT: No open lesions or ulcers  VASCULAR: Pulses intact, symmetrical upper and lower extremities. There areskin changes consistent with chronic venous insufficiency. Varicose veins present in bilateral greater saphenous distribution. Spider veins present bilateral.                              Side:: Bilateral  VCSS  PAIN:: Moderate: Daily moderate activity limitation, occasional pain medication  Varicose Veins:: Mild: Few scattered  Venous Edema:: Mild: Evening ankle swelling only  Skin Pigmentation:: Mild: Diffuse, but limited in area and old (brown)  Inflamation:: Absent: None  Induration:: Absent: None  Number of active ulcers:: 0  Active ulcer duration:: None  Active ulcer diameter:: None  Compression Therapy:: Full compliance stockings + elevation  VCSS Score:: 8  CEAP:: A healed venous ulcer  Patient reported symptoms  Heaviness: none of the time  Achiness: a little of the time  Swelling: a good bit of the time  Throbbing: none of the time  Itching: none of the time  Impact on work/activity: a good bit of the time    Imaging:        Exam Information    Exam Date Exam Time Accession # Performing Department Results    8/2/24  3:56 PM [id number] [facility]      PACS Images     Show images for US Venous Competency Bilateral     Study Result    Narrative & Impression   BILATERAL Venous Insufficiency Ultrasound (Date: 08/02/24)    BILATERAL Lower  Extremity          Indication: Bilateral leg swelling/rt medial ankle ulcer     Previous: 07/03/27-US Venous     Patient History: Swelling and Stasis     Presenting Symptoms:  Swelling, Ulcers, and Stasis     Technique:   Supine and Upright Ultrasound of the Deep and Superficial Veins with Valsalva and Compression Augmentation Maneuvers. Duplex Imaging is performed utilizing gray-scale, Two-dimensional images, color-flow imaging, Doppler waveform analysis, and Spectral doppler imaging done with provacative maneuvers.      Incompetency Criteria:  Deep vein reflux reported when greater than 1000 msec flow reversal. Superficial vein reflux reported when equal to or greater than 600 msec flow reversal.  vein reflux reported as greater than 350 msec flow reversal.      Right  Leg Deep Veins    CFV SFJ DFV PROX FV   PROX FV MID FV DIST POP V. PERON.   V. PTV'S   Compressibility  (FC,PC,NC) FC FC FC FC FC FC FC FC FC   Reflux +   - + - + +   +         Right Leg Superficial Veins  Location SFJ PROX THIGH MID THIGH KNEE MID CALF   GSV (mm) 9 6 4 2 3   Reflux + + - - -   AASV (mm) 3           Reflux -           PASV (mm) 4           Reflux -              Location SPJ PROX CALF MID CALF   SSV (mm) 3 3 3   Reflux - - -      Left  Leg Deep Veins    CFV SFJ DFV PROX FV   PROX FV MID FV DIST POP V. PERON.   V. PTV'S   Compressibility  (FC,PC,NC) FC FC FC FC FC FC FC FC FC   Reflux +   - - - - +   +         Left Leg Superficial Veins  Location SFJ PROX THIGH MID THIGH KNEE MID CALF   GSV (mm) 8 8 5 4 2   Reflux + + + + -   AASV (mm) 4           Reflux -           PASV (mm) 3           Reflux -              Location SPJ PROX CALF MID CALF   SSV (mm) 3 3 4   Reflux - - -      Comments: Bilateral incompetent  veins visualized. Right calf distal IPV measures 4.9 mm with 2.1 seconds of reflux. This IPV feeds the ulcer. Left calf mid IPV measures 4.7 mm with 1.6 seconds of reflux. Left calf distal IPV measures 3.7 mm  with 1.8 seconds of reflux.      Right GSV is straight enough to ablate.     Left GSV is straight enough to ablate.     Impression:       Right Deep Vein Findings: Patent deep venous system with no evidence of DVT. Incompetence of right common femoral, proximal femoral, distal femoral and popliteal veins.      Left Deep Vein Findings: Patent deep venous system with no evidence of DVT. Incompetence of left common femoral and popliteal veins.      Superficial Vein Findings:      Right Greater Saphenous Vein:  Incompetent at saphenofemoral junction and proximal thigh with maximal diameter of 9 mm.  .     Right Small Saphenous Vein: Patent Small Saphenous Vein without evidence of reflux.     Left Greater Saphenous Vein: Patent Greater Saphenous vein with Reflux noted throughout its course with a Maximum diameter of 8 mm. .     Left Small Saphenous Vein: Patent Small Saphenous Vein without evidence of reflux.     Perforating and Accessory Veins: Competent bilaterally.      Reference:     Compressibility: FC= Fully compressible, PC= Partially compressible, NC= Non-compressible, NV= Not Visualized     Reflux: (+) Incompetent  (-) Competent, (NV) = Not Visualized     Interpretation criteria:          Duration of Retrograde flow (milliseconds)  Category Deep Veins Superficial Veins  veins   Competent < 1000ms < 500ms < 350ms   Incompetent > 1000ms > 500ms > 350ms      DAPHNEY JENSEN M.D., FACS, [facility]           Morteza Cevallos MD  General Surgery [phone]  Vascular Surgery [phone]

## 2024-11-25 ENCOUNTER — TELEPHONE (OUTPATIENT)
Dept: VASCULAR SURGERY | Facility: CLINIC | Age: 64
End: 2024-11-25
Payer: COMMERCIAL

## 2024-11-25 NOTE — TELEPHONE ENCOUNTER
PA approved.    Payor: [employer], [employer]   Procedure: Radiofrequency ablation (RFA) of VICENTA GSV, VICENTA PERF   CPTs approved: 36475 x2 and 36476 x2  Effective dates: 11/04/2024-03/03/2025  PA reference/auth #: [facility]: [id number] Medicare: [id number]  Need 2 days for procedure?: Yes

## 2024-12-24 DIAGNOSIS — I10 PRIMARY HYPERTENSION: ICD-10-CM

## 2024-12-24 DIAGNOSIS — I71.21 ANEURYSM OF ASCENDING AORTA WITHOUT RUPTURE (H): ICD-10-CM

## 2024-12-24 RX ORDER — AMLODIPINE BESYLATE 10 MG/1
10 TABLET ORAL DAILY
Qty: 90 TABLET | Refills: 1 | Status: SHIPPED | OUTPATIENT
Start: 2024-12-24

## 2025-05-18 ENCOUNTER — HEALTH MAINTENANCE LETTER (OUTPATIENT)
Age: 65
End: 2025-05-18

## 2025-06-19 DIAGNOSIS — I71.21 ANEURYSM OF ASCENDING AORTA WITHOUT RUPTURE: ICD-10-CM

## 2025-06-19 DIAGNOSIS — I10 PRIMARY HYPERTENSION: ICD-10-CM

## 2025-06-19 RX ORDER — AMLODIPINE BESYLATE 10 MG/1
10 TABLET ORAL DAILY
Qty: 90 TABLET | Refills: 0 | Status: SHIPPED | OUTPATIENT
Start: 2025-06-19

## 2025-08-12 ENCOUNTER — MYC MEDICAL ADVICE (OUTPATIENT)
Dept: FAMILY MEDICINE | Facility: CLINIC | Age: 65
End: 2025-08-12
Payer: COMMERCIAL

## 2025-08-14 ENCOUNTER — HOSPITAL ENCOUNTER (OUTPATIENT)
Dept: CARDIOLOGY | Facility: HOSPITAL | Age: 65
End: 2025-08-14
Attending: STUDENT IN AN ORGANIZED HEALTH CARE EDUCATION/TRAINING PROGRAM
Payer: COMMERCIAL

## 2025-08-14 DIAGNOSIS — I71.21 ANEURYSM OF ASCENDING AORTA WITHOUT RUPTURE: ICD-10-CM

## 2025-08-14 LAB — LVEF ECHO: NORMAL

## 2025-08-14 PROCEDURE — 93306 TTE W/DOPPLER COMPLETE: CPT

## 2025-08-16 ENCOUNTER — APPOINTMENT (OUTPATIENT)
Dept: CT IMAGING | Facility: HOSPITAL | Age: 65
End: 2025-08-16
Attending: EMERGENCY MEDICINE

## 2025-08-16 ENCOUNTER — HOSPITAL ENCOUNTER (OUTPATIENT)
Facility: HOSPITAL | Age: 65
Setting detail: OBSERVATION
Discharge: HOME | End: 2025-08-17
Attending: EMERGENCY MEDICINE

## 2025-08-16 ENCOUNTER — APPOINTMENT (OUTPATIENT)
Dept: CT IMAGING | Facility: HOSPITAL | Age: 65
End: 2025-08-16
Attending: FAMILY MEDICINE

## 2025-08-16 DIAGNOSIS — R55 NEAR SYNCOPE: Primary | ICD-10-CM

## 2025-08-16 DIAGNOSIS — R25.1 TREMOR: ICD-10-CM

## 2025-08-16 LAB
ANION GAP SERPL CALCULATED.3IONS-SCNC: 10 MMOL/L (ref 7–15)
BASOPHILS # BLD AUTO: 0.03 10E3/UL (ref 0–0.2)
BASOPHILS NFR BLD AUTO: 0.5 %
BUN SERPL-MCNC: 14.2 MG/DL (ref 8–23)
CALCIUM SERPL-MCNC: 11.1 MG/DL (ref 8.8–10.4)
CHLORIDE SERPL-SCNC: 107 MMOL/L (ref 98–107)
CREAT SERPL-MCNC: 0.77 MG/DL (ref 0.67–1.17)
EGFRCR SERPLBLD CKD-EPI 2021: >90 ML/MIN/1.73M2
EOSINOPHIL # BLD AUTO: 0.28 10E3/UL (ref 0–0.7)
EOSINOPHIL NFR BLD AUTO: 4.5 %
ERYTHROCYTE [DISTWIDTH] IN BLOOD BY AUTOMATED COUNT: 13.6 % (ref 10–15)
GLUCOSE BLDC GLUCOMTR-MCNC: 99 MG/DL (ref 70–99)
GLUCOSE SERPL-MCNC: 97 MG/DL (ref 70–99)
HCO3 SERPL-SCNC: 24 MMOL/L (ref 22–29)
HCT VFR BLD AUTO: 43.4 % (ref 40–53)
HGB BLD-MCNC: 14.3 G/DL (ref 13.3–17.7)
IMM GRANULOCYTES # BLD: <0.03 10E3/UL
IMM GRANULOCYTES NFR BLD: 0.2 %
LYMPHOCYTES # BLD AUTO: 1.23 10E3/UL (ref 0.8–5.3)
LYMPHOCYTES NFR BLD AUTO: 19.7 %
MAGNESIUM SERPL-MCNC: 2.5 MG/DL (ref 1.7–2.3)
MCH RBC QN AUTO: 29.4 PG (ref 26.5–33)
MCHC RBC AUTO-ENTMCNC: 32.9 G/DL (ref 31.5–36.5)
MCV RBC AUTO: 89.3 FL (ref 78–100)
MONOCYTES # BLD AUTO: 0.44 10E3/UL (ref 0–1.3)
MONOCYTES NFR BLD AUTO: 7.1 %
NEUTROPHILS # BLD AUTO: 4.24 10E3/UL (ref 1.6–8.3)
NEUTROPHILS NFR BLD AUTO: 68 %
NRBC # BLD AUTO: <0.03 10E3/UL
NRBC BLD AUTO-RTO: 0 /100
PLATELET # BLD AUTO: 173 10E3/UL (ref 150–450)
POTASSIUM SERPL-SCNC: 4.1 MMOL/L (ref 3.4–5.3)
RBC # BLD AUTO: 4.86 10E6/UL (ref 4.4–5.9)
SODIUM SERPL-SCNC: 141 MMOL/L (ref 135–145)
TROPONIN T SERPL HS-MCNC: 13 NG/L
TROPONIN T SERPL HS-MCNC: 15 NG/L
WBC # BLD AUTO: 6.23 10E3/UL (ref 4–11)

## 2025-08-16 PROCEDURE — 999N000157 HC STATISTIC RCP TIME EA 10 MIN

## 2025-08-16 PROCEDURE — 74174 CTA ABD&PLVS W/CONTRAST: CPT

## 2025-08-16 PROCEDURE — 83735 ASSAY OF MAGNESIUM: CPT | Performed by: EMERGENCY MEDICINE

## 2025-08-16 PROCEDURE — 82962 GLUCOSE BLOOD TEST: CPT

## 2025-08-16 PROCEDURE — 36415 COLL VENOUS BLD VENIPUNCTURE: CPT | Performed by: EMERGENCY MEDICINE

## 2025-08-16 PROCEDURE — 99285 EMERGENCY DEPT VISIT HI MDM: CPT | Mod: 25 | Performed by: EMERGENCY MEDICINE

## 2025-08-16 PROCEDURE — 93005 ELECTROCARDIOGRAM TRACING: CPT | Performed by: EMERGENCY MEDICINE

## 2025-08-16 PROCEDURE — 99221 1ST HOSP IP/OBS SF/LOW 40: CPT | Performed by: FAMILY MEDICINE

## 2025-08-16 PROCEDURE — G0378 HOSPITAL OBSERVATION PER HR: HCPCS

## 2025-08-16 PROCEDURE — 250N000011 HC RX IP 250 OP 636: Performed by: EMERGENCY MEDICINE

## 2025-08-16 PROCEDURE — 85004 AUTOMATED DIFF WBC COUNT: CPT | Performed by: EMERGENCY MEDICINE

## 2025-08-16 PROCEDURE — 99254 IP/OBS CNSLTJ NEW/EST MOD 60: CPT | Performed by: STUDENT IN AN ORGANIZED HEALTH CARE EDUCATION/TRAINING PROGRAM

## 2025-08-16 PROCEDURE — 80048 BASIC METABOLIC PNL TOTAL CA: CPT | Performed by: EMERGENCY MEDICINE

## 2025-08-16 PROCEDURE — 84484 ASSAY OF TROPONIN QUANT: CPT | Performed by: EMERGENCY MEDICINE

## 2025-08-16 PROCEDURE — 96374 THER/PROPH/DIAG INJ IV PUSH: CPT

## 2025-08-16 PROCEDURE — 70450 CT HEAD/BRAIN W/O DYE: CPT

## 2025-08-16 RX ORDER — HYDRALAZINE HYDROCHLORIDE 20 MG/ML
10 INJECTION INTRAMUSCULAR; INTRAVENOUS EVERY 6 HOURS PRN
Status: DISCONTINUED | OUTPATIENT
Start: 2025-08-16 | End: 2025-08-17 | Stop reason: HOSPADM

## 2025-08-16 RX ORDER — OMEPRAZOLE 20 MG/1
20 TABLET, DELAYED RELEASE ORAL DAILY
COMMUNITY

## 2025-08-16 RX ORDER — ONDANSETRON 2 MG/ML
4 INJECTION INTRAMUSCULAR; INTRAVENOUS EVERY 6 HOURS PRN
Status: DISCONTINUED | OUTPATIENT
Start: 2025-08-16 | End: 2025-08-17 | Stop reason: HOSPADM

## 2025-08-16 RX ORDER — ACETAMINOPHEN 650 MG/1
650 SUPPOSITORY RECTAL EVERY 4 HOURS PRN
Status: DISCONTINUED | OUTPATIENT
Start: 2025-08-16 | End: 2025-08-17 | Stop reason: HOSPADM

## 2025-08-16 RX ORDER — FLUTICASONE PROPIONATE 50 MCG
1 SPRAY, SUSPENSION (ML) NASAL DAILY
COMMUNITY

## 2025-08-16 RX ORDER — IOPAMIDOL 755 MG/ML
90 INJECTION, SOLUTION INTRAVASCULAR ONCE
Status: COMPLETED | OUTPATIENT
Start: 2025-08-16 | End: 2025-08-16

## 2025-08-16 RX ORDER — PANTOPRAZOLE SODIUM 40 MG/1
40 TABLET, DELAYED RELEASE ORAL DAILY
Status: DISCONTINUED | OUTPATIENT
Start: 2025-08-17 | End: 2025-08-17 | Stop reason: HOSPADM

## 2025-08-16 RX ORDER — ACETAMINOPHEN 325 MG/1
650 TABLET ORAL EVERY 4 HOURS PRN
Status: DISCONTINUED | OUTPATIENT
Start: 2025-08-16 | End: 2025-08-17 | Stop reason: HOSPADM

## 2025-08-16 RX ORDER — PROCHLORPERAZINE MALEATE 5 MG/1
5 TABLET ORAL EVERY 6 HOURS PRN
Status: DISCONTINUED | OUTPATIENT
Start: 2025-08-16 | End: 2025-08-17 | Stop reason: HOSPADM

## 2025-08-16 RX ORDER — AMLODIPINE BESYLATE 5 MG/1
10 TABLET ORAL DAILY
Status: DISCONTINUED | OUTPATIENT
Start: 2025-08-17 | End: 2025-08-17 | Stop reason: HOSPADM

## 2025-08-16 RX ORDER — LIDOCAINE 40 MG/G
CREAM TOPICAL
Status: DISCONTINUED | OUTPATIENT
Start: 2025-08-16 | End: 2025-08-17 | Stop reason: HOSPADM

## 2025-08-16 RX ORDER — CETIRIZINE HYDROCHLORIDE 10 MG/1
10 TABLET ORAL DAILY
Status: DISCONTINUED | OUTPATIENT
Start: 2025-08-17 | End: 2025-08-17 | Stop reason: HOSPADM

## 2025-08-16 RX ORDER — FLUTICASONE PROPIONATE 50 MCG
1 SPRAY, SUSPENSION (ML) NASAL DAILY
Status: DISCONTINUED | OUTPATIENT
Start: 2025-08-17 | End: 2025-08-17 | Stop reason: HOSPADM

## 2025-08-16 RX ORDER — ONDANSETRON 4 MG/1
4 TABLET, ORALLY DISINTEGRATING ORAL EVERY 6 HOURS PRN
Status: DISCONTINUED | OUTPATIENT
Start: 2025-08-16 | End: 2025-08-17 | Stop reason: HOSPADM

## 2025-08-16 RX ADMIN — IOPAMIDOL 90 ML: 755 INJECTION, SOLUTION INTRAVENOUS at 11:18

## 2025-08-16 ASSESSMENT — ACTIVITIES OF DAILY LIVING (ADL)
ADLS_ACUITY_SCORE: 41
ADLS_ACUITY_SCORE: 35
ADLS_ACUITY_SCORE: 41
ADLS_ACUITY_SCORE: 35
ADLS_ACUITY_SCORE: 41
ADLS_ACUITY_SCORE: 35
ADLS_ACUITY_SCORE: 35
ADLS_ACUITY_SCORE: 41
ADLS_ACUITY_SCORE: 35
ADLS_ACUITY_SCORE: 35
ADLS_ACUITY_SCORE: 41
ADLS_ACUITY_SCORE: 35
ADLS_ACUITY_SCORE: 35
ADLS_ACUITY_SCORE: 41

## 2025-08-17 ENCOUNTER — APPOINTMENT (OUTPATIENT)
Dept: NEUROLOGY | Facility: HOSPITAL | Age: 65
End: 2025-08-17
Attending: EMERGENCY MEDICINE

## 2025-08-17 VITALS
HEART RATE: 68 BPM | BODY MASS INDEX: 31.95 KG/M2 | SYSTOLIC BLOOD PRESSURE: 154 MMHG | TEMPERATURE: 98.3 F | DIASTOLIC BLOOD PRESSURE: 83 MMHG | RESPIRATION RATE: 20 BRPM | HEIGHT: 75 IN | OXYGEN SATURATION: 93 % | WEIGHT: 257 LBS

## 2025-08-17 PROBLEM — I71.21 ASCENDING AORTIC ANEURYSM: Status: ACTIVE | Noted: 2025-08-17

## 2025-08-17 PROBLEM — I87.2 VENOUS (PERIPHERAL) INSUFFICIENCY: Status: ACTIVE | Noted: 2025-08-17

## 2025-08-17 PROBLEM — I83.009: Status: ACTIVE | Noted: 2025-08-17

## 2025-08-17 PROBLEM — E83.52 HYPERCALCEMIA: Status: ACTIVE | Noted: 2025-08-17

## 2025-08-17 PROBLEM — L97.902: Status: ACTIVE | Noted: 2025-08-17

## 2025-08-17 LAB
ALBUMIN SERPL BCG-MCNC: 3.7 G/DL (ref 3.5–5.2)
ANION GAP SERPL CALCULATED.3IONS-SCNC: 8 MMOL/L (ref 7–15)
BUN SERPL-MCNC: 13.5 MG/DL (ref 8–23)
CALCIUM SERPL-MCNC: 11.1 MG/DL (ref 8.8–10.4)
CHLORIDE SERPL-SCNC: 107 MMOL/L (ref 98–107)
CREAT SERPL-MCNC: 0.69 MG/DL (ref 0.67–1.17)
EGFRCR SERPLBLD CKD-EPI 2021: >90 ML/MIN/1.73M2
GLUCOSE BLDC GLUCOMTR-MCNC: 100 MG/DL (ref 70–99)
GLUCOSE SERPL-MCNC: 93 MG/DL (ref 70–99)
HCO3 SERPL-SCNC: 24 MMOL/L (ref 22–29)
HOLD SPECIMEN: NORMAL
POTASSIUM SERPL-SCNC: 3.8 MMOL/L (ref 3.4–5.3)
SODIUM SERPL-SCNC: 139 MMOL/L (ref 135–145)
TSH SERPL DL<=0.005 MIU/L-ACNC: 3.69 UIU/ML (ref 0.3–4.2)

## 2025-08-17 PROCEDURE — 99207 PR NON-BILLABLE SERV PER CHARTING: CPT | Performed by: INTERNAL MEDICINE

## 2025-08-17 PROCEDURE — 82962 GLUCOSE BLOOD TEST: CPT

## 2025-08-17 PROCEDURE — 999N000111 HC STATISTIC OT IP EVAL DEFER

## 2025-08-17 PROCEDURE — 95816 EEG AWAKE AND DROWSY: CPT | Mod: 26 | Performed by: PSYCHIATRY & NEUROLOGY

## 2025-08-17 PROCEDURE — 99233 SBSQ HOSP IP/OBS HIGH 50: CPT | Mod: 25 | Performed by: STUDENT IN AN ORGANIZED HEALTH CARE EDUCATION/TRAINING PROGRAM

## 2025-08-17 PROCEDURE — 84443 ASSAY THYROID STIM HORMONE: CPT

## 2025-08-17 PROCEDURE — 99239 HOSP IP/OBS DSCHRG MGMT >30: CPT | Mod: FS

## 2025-08-17 PROCEDURE — 80048 BASIC METABOLIC PNL TOTAL CA: CPT | Performed by: FAMILY MEDICINE

## 2025-08-17 PROCEDURE — 82040 ASSAY OF SERUM ALBUMIN: CPT

## 2025-08-17 PROCEDURE — 250N000013 HC RX MED GY IP 250 OP 250 PS 637: Performed by: FAMILY MEDICINE

## 2025-08-17 PROCEDURE — 95816 EEG AWAKE AND DROWSY: CPT

## 2025-08-17 PROCEDURE — G0378 HOSPITAL OBSERVATION PER HR: HCPCS

## 2025-08-17 PROCEDURE — 36415 COLL VENOUS BLD VENIPUNCTURE: CPT | Performed by: FAMILY MEDICINE

## 2025-08-17 RX ADMIN — CETIRIZINE HYDROCHLORIDE 10 MG: 10 TABLET, FILM COATED ORAL at 08:03

## 2025-08-17 RX ADMIN — AMLODIPINE BESYLATE 10 MG: 5 TABLET ORAL at 08:02

## 2025-08-17 RX ADMIN — PANTOPRAZOLE SODIUM 40 MG: 40 TABLET, DELAYED RELEASE ORAL at 08:03

## 2025-08-17 ASSESSMENT — ACTIVITIES OF DAILY LIVING (ADL)
ADLS_ACUITY_SCORE: 48
ADLS_ACUITY_SCORE: 35
ADLS_ACUITY_SCORE: 35
ADLS_ACUITY_SCORE: 48
ADLS_ACUITY_SCORE: 35
DEPENDENT_IADLS:: INDEPENDENT
ADLS_ACUITY_SCORE: 35
ADLS_ACUITY_SCORE: 48
ADLS_ACUITY_SCORE: 35
ADLS_ACUITY_SCORE: 48
ADLS_ACUITY_SCORE: 48

## 2025-08-18 LAB
ATRIAL RATE - MUSE: 79 BPM
DIASTOLIC BLOOD PRESSURE - MUSE: 78 MMHG
INTERPRETATION ECG - MUSE: NORMAL
P AXIS - MUSE: 48 DEGREES
PR INTERVAL - MUSE: 176 MS
QRS DURATION - MUSE: 106 MS
QT - MUSE: 368 MS
QTC - MUSE: 421 MS
R AXIS - MUSE: 82 DEGREES
SYSTOLIC BLOOD PRESSURE - MUSE: 170 MMHG
T AXIS - MUSE: 56 DEGREES
VENTRICULAR RATE- MUSE: 79 BPM

## 2025-08-19 ENCOUNTER — PATIENT OUTREACH (OUTPATIENT)
Dept: CARE COORDINATION | Facility: CLINIC | Age: 65
End: 2025-08-19
Payer: COMMERCIAL

## 2025-08-20 ENCOUNTER — OFFICE VISIT (OUTPATIENT)
Dept: FAMILY MEDICINE | Facility: CLINIC | Age: 65
End: 2025-08-20

## 2025-08-20 VITALS
HEIGHT: 75 IN | HEART RATE: 70 BPM | TEMPERATURE: 98.3 F | BODY MASS INDEX: 31.76 KG/M2 | OXYGEN SATURATION: 99 % | RESPIRATION RATE: 18 BRPM | WEIGHT: 255.4 LBS | SYSTOLIC BLOOD PRESSURE: 149 MMHG | DIASTOLIC BLOOD PRESSURE: 69 MMHG

## 2025-08-20 DIAGNOSIS — Z09 HOSPITAL DISCHARGE FOLLOW-UP: Primary | ICD-10-CM

## 2025-08-20 DIAGNOSIS — E83.52 HYPERCALCEMIA: ICD-10-CM

## 2025-08-20 DIAGNOSIS — Z13.220 LIPID SCREENING: ICD-10-CM

## 2025-08-20 DIAGNOSIS — R55 NEAR SYNCOPE: ICD-10-CM

## 2025-08-20 DIAGNOSIS — Z12.11 SCREEN FOR COLON CANCER: ICD-10-CM

## 2025-08-20 DIAGNOSIS — R25.1 TREMOR: ICD-10-CM

## 2025-08-20 DIAGNOSIS — I10 BENIGN ESSENTIAL HYPERTENSION: ICD-10-CM

## 2025-08-20 PROBLEM — I83.009: Status: RESOLVED | Noted: 2025-08-17 | Resolved: 2025-08-20

## 2025-08-20 PROBLEM — L97.902: Status: RESOLVED | Noted: 2025-08-17 | Resolved: 2025-08-20

## 2025-08-20 LAB
CHOLEST SERPL-MCNC: 160 MG/DL
HDLC SERPL-MCNC: 52 MG/DL
LDLC SERPL CALC-MCNC: 98 MG/DL
NONHDLC SERPL-MCNC: 108 MG/DL
TRIGL SERPL-MCNC: 48 MG/DL

## 2025-08-20 RX ORDER — MULTIVITAMIN
1 TABLET ORAL DAILY
COMMUNITY
End: 2025-08-20

## 2025-08-20 RX ORDER — LISINOPRIL 5 MG/1
5 TABLET ORAL EVERY MORNING
Qty: 30 TABLET | Refills: 0 | Status: SHIPPED | OUTPATIENT
Start: 2025-08-20

## 2025-08-21 ENCOUNTER — PATIENT OUTREACH (OUTPATIENT)
Dept: CARE COORDINATION | Facility: CLINIC | Age: 65
End: 2025-08-21
Payer: COMMERCIAL

## 2025-09-04 ENCOUNTER — OFFICE VISIT (OUTPATIENT)
Dept: CARDIOLOGY | Facility: CLINIC | Age: 65
End: 2025-09-04
Payer: COMMERCIAL

## 2025-09-04 VITALS
WEIGHT: 251 LBS | RESPIRATION RATE: 16 BRPM | DIASTOLIC BLOOD PRESSURE: 74 MMHG | HEIGHT: 75 IN | SYSTOLIC BLOOD PRESSURE: 153 MMHG | BODY MASS INDEX: 31.21 KG/M2 | HEART RATE: 55 BPM

## 2025-09-04 DIAGNOSIS — I71.21 ANEURYSM OF ASCENDING AORTA WITHOUT RUPTURE: Primary | ICD-10-CM

## 2025-09-04 DIAGNOSIS — I35.8 AORTIC VALVE SCLEROSIS: ICD-10-CM

## 2025-09-04 DIAGNOSIS — R55 NEAR SYNCOPE: ICD-10-CM

## 2025-09-04 DIAGNOSIS — I10 PRIMARY HYPERTENSION: ICD-10-CM
